# Patient Record
Sex: FEMALE | Race: OTHER | Employment: UNEMPLOYED | URBAN - METROPOLITAN AREA
[De-identification: names, ages, dates, MRNs, and addresses within clinical notes are randomized per-mention and may not be internally consistent; named-entity substitution may affect disease eponyms.]

---

## 2017-06-07 ENCOUNTER — HOSPITAL ENCOUNTER (OUTPATIENT)
Dept: BONE DENSITY | Age: 52
Discharge: HOME | End: 2017-06-07
Attending: FAMILY MEDICINE
Payer: COMMERCIAL

## 2017-06-07 DIAGNOSIS — Z78.0 POSTMENOPAUSAL: ICD-10-CM

## 2017-06-07 PROCEDURE — 77080 DXA BONE DENSITY AXIAL: CPT | Performed by: FAMILY MEDICINE

## 2017-08-01 ENCOUNTER — OFFICE VISIT (OUTPATIENT)
Dept: OBGYN CLINIC | Facility: CLINIC | Age: 52
End: 2017-08-01

## 2017-08-01 VITALS
HEART RATE: 72 BPM | DIASTOLIC BLOOD PRESSURE: 77 MMHG | BODY MASS INDEX: 37 KG/M2 | SYSTOLIC BLOOD PRESSURE: 118 MMHG | WEIGHT: 193.38 LBS

## 2017-08-01 DIAGNOSIS — Z01.419 ENCOUNTER FOR GYNECOLOGICAL EXAMINATION WITHOUT ABNORMAL FINDING: Primary | ICD-10-CM

## 2017-08-01 DIAGNOSIS — Z12.31 VISIT FOR SCREENING MAMMOGRAM: ICD-10-CM

## 2017-08-01 PROCEDURE — 99396 PREV VISIT EST AGE 40-64: CPT | Performed by: OBSTETRICS & GYNECOLOGY

## 2017-08-01 RX ORDER — ERGOCALCIFEROL 1.25 MG/1
CAPSULE ORAL
COMMUNITY
Start: 2017-06-09 | End: 2020-10-01

## 2017-08-01 RX ORDER — ZOLPIDEM TARTRATE 5 MG/1
TABLET ORAL
COMMUNITY
Start: 2017-07-06

## 2017-08-01 RX ORDER — OMEPRAZOLE 20 MG/1
CAPSULE, DELAYED RELEASE ORAL
COMMUNITY
Start: 2017-06-17 | End: 2017-09-15

## 2017-08-01 NOTE — PROGRESS NOTES
Brown Areas is a 47year old female  No LMP recorded. Patient is postmenopausal. Patient presents with:  Gyn Exam: Annual and mammo order  .     OBSTETRICS HISTORY:  OB History    Para Term  AB Living   2 2 2     2   SAB TAB Ectopic Mu • Diabetes Mother    • Hypertension Mother    • Lipids Mother      hyperlipidemia   • Hypertension Father    • Colon Cancer Father    • Lipids Brother      hyperlipidemia   • Ovarian Cancer Paternal Aunt 39   • Colon Cancer Paternal Grandfather 76   • Ca no adenopathy  Lymphatic:no abnormal supraclavicular or axillary adenopathy is noted  Breast: normal without palpable masses, tenderness, asymmetry, nipple discharge, nipple retraction or skin changes  Abdomen:  soft, nontender, nondistended, no masses  Sk

## 2017-08-08 ENCOUNTER — HOSPITAL ENCOUNTER (OUTPATIENT)
Dept: MAMMOGRAPHY | Age: 52
Discharge: HOME | End: 2017-08-08
Attending: OBSTETRICS & GYNECOLOGY
Payer: COMMERCIAL

## 2017-08-08 DIAGNOSIS — Z12.31 VISIT FOR SCREENING MAMMOGRAM: ICD-10-CM

## 2017-08-08 PROCEDURE — 77067 SCR MAMMO BI INCL CAD: CPT | Performed by: OBSTETRICS & GYNECOLOGY

## 2017-08-14 NOTE — PROGRESS NOTES
Normal mammogram.  Next in one year. Encouraged to do monthly self breast exams. Letter sent via mail.

## 2017-09-15 ENCOUNTER — TELEPHONE (OUTPATIENT)
Dept: GASTROENTEROLOGY | Facility: CLINIC | Age: 52
End: 2017-09-15

## 2017-09-15 ENCOUNTER — OFFICE VISIT (OUTPATIENT)
Dept: GASTROENTEROLOGY | Facility: CLINIC | Age: 52
End: 2017-09-15

## 2017-09-15 VITALS
WEIGHT: 188.63 LBS | DIASTOLIC BLOOD PRESSURE: 71 MMHG | HEIGHT: 60 IN | SYSTOLIC BLOOD PRESSURE: 104 MMHG | HEART RATE: 70 BPM | BODY MASS INDEX: 37.03 KG/M2

## 2017-09-15 DIAGNOSIS — K29.70 HELICOBACTER POSITIVE GASTRITIS: Primary | ICD-10-CM

## 2017-09-15 DIAGNOSIS — R10.13 DYSPEPSIA: Primary | ICD-10-CM

## 2017-09-15 DIAGNOSIS — R10.13 DYSPEPSIA: ICD-10-CM

## 2017-09-15 DIAGNOSIS — R10.13 EPIGASTRIC ABDOMINAL PAIN: ICD-10-CM

## 2017-09-15 DIAGNOSIS — B96.81 HELICOBACTER POSITIVE GASTRITIS: Primary | ICD-10-CM

## 2017-09-15 PROCEDURE — 99244 OFF/OP CNSLTJ NEW/EST MOD 40: CPT | Performed by: INTERNAL MEDICINE

## 2017-09-15 PROCEDURE — 99212 OFFICE O/P EST SF 10 MIN: CPT | Performed by: INTERNAL MEDICINE

## 2017-09-15 RX ORDER — LEVOFLOXACIN 500 MG/1
500 TABLET, FILM COATED ORAL EVERY 24 HOURS
Qty: 14 TABLET | Refills: 0 | Status: SHIPPED | OUTPATIENT
Start: 2017-09-15 | End: 2018-04-16

## 2017-09-15 RX ORDER — AMOXICILLIN 500 MG/1
1000 TABLET, FILM COATED ORAL 2 TIMES DAILY
Qty: 56 TABLET | Refills: 0 | Status: SHIPPED | OUTPATIENT
Start: 2017-09-15 | End: 2017-09-29

## 2017-09-15 RX ORDER — DOCUSATE SODIUM 100 MG/1
100 CAPSULE, LIQUID FILLED ORAL 2 TIMES DAILY
COMMUNITY

## 2017-09-15 RX ORDER — OMEPRAZOLE 20 MG/1
20 CAPSULE, DELAYED RELEASE ORAL
Qty: 60 CAPSULE | Refills: 1 | Status: SHIPPED | OUTPATIENT
Start: 2017-09-15 | End: 2017-09-30

## 2017-09-15 RX ORDER — ONDANSETRON 4 MG/1
4 TABLET, ORALLY DISINTEGRATING ORAL EVERY 12 HOURS PRN
Status: SHIPPED | OUTPATIENT
Start: 2017-09-15 | End: 2017-09-19

## 2017-09-15 NOTE — TELEPHONE ENCOUNTER
Scheduled for:  EGD 48530  Provider Name: Dr Tisha Carpenter   Date:   Mon 12/06/17  Location:  [facility]   Sedation:  MAC  Time:  7:30 am  Prep: Nothing after midnight the day before procedure and NPO 4 hrs prior procedure  Meds/Allergies Reconciled?:  NKDA  Diagnosis wit

## 2017-09-15 NOTE — PATIENT INSTRUCTIONS
1. Schedule upper endoscopy (EGD) with MAC -schedule in December 2017  2. New antibiotic regimen:   1. Levofloxacin 500mg PO daily (1 tablet daily)   2. Amoxicillin 1000mg (2 pills of the 500mg pills) twice a day   3. Omeprazole 20mg twice a day   4.  Faye Garrett

## 2017-09-15 NOTE — H&P
[address] [facility] [address] Gastroenterology                                                                                                  Clinic History and Physical     Pa Relation Age of Onset   • Heart Disease Maternal Uncle      CAD   • Cancer Other      spinal cord   • Diabetes Mother    • Hypertension Mother    • Lipids Mother      hyperlipidemia   • Hypertension Father    • Colon Cancer Father    • Lipids Brother for jaundice   ALLERGIC/IMMUNOLOGIC:  negative for hay fever  ENDOCRINE:  negative for cold intolerance and heat intolerance  MUSCULOSKELETAL:  negative for joint effusion/severe erythema  BEHAVIOR/PSYCH:  negative for psychotic behavior      PHYSICAL EXAM days prior to EGD     EGD consent: I have discussed the risks, benefits, and alternatives to upper endoscopy/enteroscopy with the patient [who demonstrated understanding], including but not limited to the risks of bleeding, infection, pain, death, as well

## 2017-10-09 ENCOUNTER — TELEPHONE (OUTPATIENT)
Dept: GASTROENTEROLOGY | Facility: CLINIC | Age: 52
End: 2017-10-09

## 2017-10-09 NOTE — TELEPHONE ENCOUNTER
CLN REPORT (8/2/16) - Dr. Antonio Evans    -[facility] Core reached  -No mass or polyps  -Small internal hemorrhoids    Recommendations: repeat CLN in 5 years.

## 2017-12-01 RX ORDER — OMEPRAZOLE 20 MG/1
20 CAPSULE, DELAYED RELEASE ORAL
Status: ON HOLD | COMMUNITY
End: 2017-12-06

## 2017-12-06 ENCOUNTER — TELEPHONE (OUTPATIENT)
Dept: GASTROENTEROLOGY | Facility: CLINIC | Age: 52
End: 2017-12-06

## 2017-12-06 ENCOUNTER — SURGERY (OUTPATIENT)
Age: 52
End: 2017-12-06

## 2017-12-06 ENCOUNTER — ANESTHESIA EVENT (OUTPATIENT)
Dept: ENDOSCOPY | Facility: HOSPITAL | Age: 52
End: 2017-12-06
Payer: COMMERCIAL

## 2017-12-06 ENCOUNTER — ANESTHESIA (OUTPATIENT)
Dept: ENDOSCOPY | Facility: HOSPITAL | Age: 52
End: 2017-12-06
Payer: COMMERCIAL

## 2017-12-06 ENCOUNTER — HOSPITAL ENCOUNTER (OUTPATIENT)
Facility: HOSPITAL | Age: 52
Setting detail: HOSPITAL OUTPATIENT SURGERY
Discharge: HOME | End: 2017-12-06
Attending: INTERNAL MEDICINE | Admitting: INTERNAL MEDICINE
Payer: COMMERCIAL

## 2017-12-06 DIAGNOSIS — R10.13 EPIGASTRIC ABDOMINAL PAIN: ICD-10-CM

## 2017-12-06 DIAGNOSIS — K31.9 GASTRIC ERYTHEMA: Primary | ICD-10-CM

## 2017-12-06 DIAGNOSIS — R10.13 DYSPEPSIA: ICD-10-CM

## 2017-12-06 PROCEDURE — 43239 EGD BIOPSY SINGLE/MULTIPLE: CPT | Performed by: INTERNAL MEDICINE

## 2017-12-06 PROCEDURE — 0DB68ZX EXCISION OF STOMACH, VIA NATURAL OR ARTIFICIAL OPENING ENDOSCOPIC, DIAGNOSTIC: ICD-10-PCS | Performed by: INTERNAL MEDICINE

## 2017-12-06 PROCEDURE — 0DB98ZX EXCISION OF DUODENUM, VIA NATURAL OR ARTIFICIAL OPENING ENDOSCOPIC, DIAGNOSTIC: ICD-10-PCS | Performed by: INTERNAL MEDICINE

## 2017-12-06 RX ORDER — SODIUM CHLORIDE, SODIUM LACTATE, POTASSIUM CHLORIDE, CALCIUM CHLORIDE 600; 310; 30; 20 MG/100ML; MG/100ML; MG/100ML; MG/100ML
INJECTION, SOLUTION INTRAVENOUS CONTINUOUS
Status: DISCONTINUED | OUTPATIENT
Start: 2017-12-06 | End: 2017-12-06

## 2017-12-06 RX ORDER — LIDOCAINE HYDROCHLORIDE 10 MG/ML
INJECTION, SOLUTION EPIDURAL; INFILTRATION; INTRACAUDAL; PERINEURAL AS NEEDED
Status: DISCONTINUED | OUTPATIENT
Start: 2017-12-06 | End: 2017-12-06 | Stop reason: SURG

## 2017-12-06 RX ORDER — NALOXONE HYDROCHLORIDE 0.4 MG/ML
80 INJECTION, SOLUTION INTRAMUSCULAR; INTRAVENOUS; SUBCUTANEOUS AS NEEDED
Status: DISCONTINUED | OUTPATIENT
Start: 2017-12-06 | End: 2017-12-06

## 2017-12-06 RX ORDER — OMEPRAZOLE 20 MG/1
20 CAPSULE, DELAYED RELEASE ORAL
Qty: 90 CAPSULE | Refills: 3 | Status: SHIPPED | OUTPATIENT
Start: 2017-12-06 | End: 2017-12-07

## 2017-12-06 RX ORDER — SODIUM CHLORIDE, SODIUM LACTATE, POTASSIUM CHLORIDE, CALCIUM CHLORIDE 600; 310; 30; 20 MG/100ML; MG/100ML; MG/100ML; MG/100ML
INJECTION, SOLUTION INTRAVENOUS CONTINUOUS PRN
Status: DISCONTINUED | OUTPATIENT
Start: 2017-12-06 | End: 2017-12-06 | Stop reason: SURG

## 2017-12-06 RX ADMIN — SODIUM CHLORIDE, SODIUM LACTATE, POTASSIUM CHLORIDE, CALCIUM CHLORIDE: 600; 310; 30; 20 INJECTION, SOLUTION INTRAVENOUS at 07:47:00

## 2017-12-06 RX ADMIN — LIDOCAINE HYDROCHLORIDE 100 MG: 10 INJECTION, SOLUTION EPIDURAL; INFILTRATION; INTRACAUDAL; PERINEURAL at 07:35:00

## 2017-12-06 RX ADMIN — SODIUM CHLORIDE, SODIUM LACTATE, POTASSIUM CHLORIDE, CALCIUM CHLORIDE: 600; 310; 30; 20 INJECTION, SOLUTION INTRAVENOUS at 07:34:00

## 2017-12-06 NOTE — ANESTHESIA PREPROCEDURE EVALUATION
Anesthesia PreOp Note    HPI:     Yue Samano is a 47year old female who presents for preoperative consultation requested by: Vlad Valente MD    Date of Surgery: 12/6/2017    Procedure(s):  ESOPHAGOGASTRODUODENOSCOPY (EGD)  Indication: Dyspepsia  Epi Taking       No current Epic-ordered facility-administered medications on file. No current [facility]-ordered outpatient prescriptions on file.     No Known Allergies    Family History   Problem Relation Age of Onset   • Heart Disease Maternal Uncle      CAD Anesthesia Plan:   ASA:  2  Plan:   MAC  Post-op Pain Management: IV analgesics  Informed Consent Plan and Risks Discussed With:  Patient  Use of Blood Products Discussed With:  Patient      I have informed Morro Srinivasan  of the nature of the an

## 2017-12-06 NOTE — OPERATIVE REPORT
ESOPHAGOGASTRODUODENOSCOPY (EGD) REPORT    Garcia Liana WILDE 4/3/1965 Age 47year old   PCP Manda Estrada MD Endoscopist Christina Hector MD     Date of procedure: 17    Procedure: EGD w/cold biopsy    Pre-operative diagnosis: dyspepsia, hist erythema in the antrum of the stomach, otherwise normal EGD. Biopsies obtained to see if H.pylori has been successfully eradicated. 2. Suspect non-erosive acid reflux disease. Recommend:  1. Await pathology. 2. Continue omeprazole 20mg/day.   3. Return

## 2017-12-06 NOTE — ANESTHESIA POSTPROCEDURE EVALUATION
Patient: Sameer Flores    Procedure Summary     Date:  12/06/17 Room / Location:  [address] [place] [address] ENDOSCOPY 01 / [address] [place] [address] ENDOSCOPY    Anesthesia Start:  8656 Anesthesia Stop:  0740    Procedure:  ESOPHAGOGASTRODUODENOSCOPY (EGD) (N/A ) Diagnosis:       Dyspepsia      [place]

## 2017-12-06 NOTE — H&P
History & Physical Examination    Patient Name: Jaya Rivera  MRN: [id number]  CSN: [id number]  YOB: 1965    Diagnosis: dyspepsia, epigastric pain, GERD      Prescriptions Prior to Admission:  omeprazole 20 MG Oral Capsule Delayed Release Take • Lipids Mother      hyperlipidemia   • Hypertension Father    • Colon Cancer Father    • Lipids Brother      hyperlipidemia   • Colon Cancer Paternal Grandfather 76   • Cancer Maternal Aunt 60     pancreatic        Smoking status: Never Smoker    [place]

## 2017-12-07 ENCOUNTER — TELEPHONE (OUTPATIENT)
Dept: GASTROENTEROLOGY | Facility: CLINIC | Age: 52
End: 2017-12-07

## 2017-12-07 VITALS
BODY MASS INDEX: 35.68 KG/M2 | HEIGHT: 61 IN | WEIGHT: 189 LBS | RESPIRATION RATE: 13 BRPM | DIASTOLIC BLOOD PRESSURE: 69 MMHG | SYSTOLIC BLOOD PRESSURE: 99 MMHG | OXYGEN SATURATION: 95 % | HEART RATE: 57 BPM

## 2017-12-07 RX ORDER — OMEPRAZOLE 20 MG/1
20 CAPSULE, DELAYED RELEASE ORAL
Qty: 90 CAPSULE | Refills: 3 | Status: SHIPPED | OUTPATIENT
Start: 2017-12-07 | End: 2018-03-07

## 2017-12-07 NOTE — TELEPHONE ENCOUNTER
Pt requesting to speak to RN about rx:omeprazole 20 MG, pt indicates the medication is not covered, pt used up 3 refills and the next refill needs to be filled through the [facility] for ins to cover. Pls call pt at:[phone],thanks.     Current Outpati

## 2017-12-07 NOTE — TELEPHONE ENCOUNTER
Pt contacted and she states that her omeprazole RX has to be filled through [facility] mail order pharmacy as she can only fill the medication 3 times through a retail pharmacy.      RX from 12/6/17 resent to [facility] Mail order and cancelled at [facility]

## 2017-12-11 ENCOUNTER — TELEPHONE (OUTPATIENT)
Dept: GASTROENTEROLOGY | Facility: CLINIC | Age: 52
End: 2017-12-11

## 2017-12-11 NOTE — TELEPHONE ENCOUNTER
I called and left a voicemail message for pt to return to my call to schedule a 3-6 month follow up appt.    I mailed out EGD results letter to pt    em

## 2018-04-16 ENCOUNTER — OFFICE VISIT (OUTPATIENT)
Dept: GASTROENTEROLOGY | Facility: CLINIC | Age: 53
End: 2018-04-16

## 2018-04-16 VITALS
DIASTOLIC BLOOD PRESSURE: 78 MMHG | SYSTOLIC BLOOD PRESSURE: 110 MMHG | WEIGHT: 193 LBS | HEART RATE: 61 BPM | HEIGHT: 60 IN | BODY MASS INDEX: 37.89 KG/M2

## 2018-04-16 DIAGNOSIS — B96.81 HELICOBACTER POSITIVE GASTRITIS: Primary | ICD-10-CM

## 2018-04-16 DIAGNOSIS — R10.13 DYSPEPSIA: ICD-10-CM

## 2018-04-16 DIAGNOSIS — K31.9 GASTRIC ERYTHEMA: ICD-10-CM

## 2018-04-16 DIAGNOSIS — K29.70 HELICOBACTER POSITIVE GASTRITIS: Primary | ICD-10-CM

## 2018-04-16 PROCEDURE — 99213 OFFICE O/P EST LOW 20 MIN: CPT | Performed by: INTERNAL MEDICINE

## 2018-04-16 PROCEDURE — 99212 OFFICE O/P EST SF 10 MIN: CPT | Performed by: INTERNAL MEDICINE

## 2018-04-16 RX ORDER — OMEPRAZOLE 20 MG/1
20 CAPSULE, DELAYED RELEASE ORAL
COMMUNITY

## 2018-04-16 NOTE — PROGRESS NOTES
[address] [facility] Follow-up Visit    Korin the stomach, otherwise normal EGD. PATH: neg H.pylori. Normal duodenum.          History, Medications, Allergies, ROS:      Past Medical History:   Diagnosis Date   • Cholelithiasis     laparoscopic cholecystectomy 9673   • Helicobacter positive gastritis 1 Tab  Disp:  Rfl:    alprazolam (XANAX) 0.5 MG Oral Tab Take 0.5 mg by mouth. At Bedtime Disp:  Rfl: 1   Cetirizine HCl (ZYRTEC ALLERGY) 10 MG Oral Cap Take  by mouth.  Disp:  Rfl:        Allergies:  No Known Allergies    ROS:   CONSTITUTIONAL:  negative for 20mg/day for non-erosive reflux disease, she can trial off PPI to see if med helps her.      #Hpylori infection - treated/eradicated  #Dyspepsia/epigastric pain - resolved  #Screening - 2016; repeat 5 years due to family hx of CRC     Recommend:  1. OKay to

## 2018-08-30 ENCOUNTER — OFFICE VISIT (OUTPATIENT)
Dept: OBGYN CLINIC | Facility: CLINIC | Age: 53
End: 2018-08-30
Payer: COMMERCIAL

## 2018-08-30 VITALS
BODY MASS INDEX: 36.91 KG/M2 | HEIGHT: 60.7 IN | SYSTOLIC BLOOD PRESSURE: 102 MMHG | WEIGHT: 193 LBS | DIASTOLIC BLOOD PRESSURE: 69 MMHG | HEART RATE: 72 BPM

## 2018-08-30 DIAGNOSIS — Z01.419 ENCOUNTER FOR GYNECOLOGICAL EXAMINATION WITHOUT ABNORMAL FINDING: Primary | ICD-10-CM

## 2018-08-30 DIAGNOSIS — Z12.31 VISIT FOR SCREENING MAMMOGRAM: ICD-10-CM

## 2018-08-30 PROCEDURE — 99396 PREV VISIT EST AGE 40-64: CPT | Performed by: OBSTETRICS & GYNECOLOGY

## 2018-08-30 RX ORDER — ALENDRONATE SODIUM 35 MG/1
35 TABLET ORAL
COMMUNITY

## 2018-08-30 NOTE — PROGRESS NOTES
Yue Samano is a 47year old female  No LMP recorded. Patient is postmenopausal. Patient presents with:  Gyn Exam: ANNUAL EXAM. No  bleed. NO change in hx or FHx. Does not drink milk or yogurt.   .    OBSTETRICS HISTORY:  OB History    Par Alcohol use No    Drug use: No    Sexual activity: Yes    Birth control/ protection: Tubal Ligation     Other Topics Concern    Caffeine Concern Yes    Comment: 2 cups soda daily     Social History Narrative   None on file       FAMILY HISTORY:  Family His vaginal itching  Musculoskeletal:   denies back pain. Skin/Breast:   denies any breast pain, lumps, or discharge. Neurological:    denies headaches, extremity weakness or numbness. Psychiatric:   denies depression or anxiety.   Endocrine:     denies exc vaginal bleeding. Encouraged 1500 mg calcium w/ vit D. Encouraged weight bearing exercise. Follow-up in one year.

## 2018-09-11 ENCOUNTER — HOSPITAL ENCOUNTER (OUTPATIENT)
Dept: MAMMOGRAPHY | Age: 53
Discharge: HOME | End: 2018-09-11
Attending: OBSTETRICS & GYNECOLOGY
Payer: COMMERCIAL

## 2018-09-11 DIAGNOSIS — Z12.31 VISIT FOR SCREENING MAMMOGRAM: ICD-10-CM

## 2018-09-11 PROCEDURE — 77067 SCR MAMMO BI INCL CAD: CPT | Performed by: OBSTETRICS & GYNECOLOGY

## 2019-04-06 ENCOUNTER — HOSPITAL ENCOUNTER (OUTPATIENT)
Dept: GENERAL RADIOLOGY | Age: 54
Discharge: HOME | End: 2019-04-06
Attending: FAMILY MEDICINE
Payer: COMMERCIAL

## 2019-04-06 DIAGNOSIS — R05.3 CHRONIC COUGH: ICD-10-CM

## 2019-04-06 PROCEDURE — 71046 X-RAY EXAM CHEST 2 VIEWS: CPT | Performed by: FAMILY MEDICINE

## 2019-09-05 ENCOUNTER — OFFICE VISIT (OUTPATIENT)
Dept: OBGYN CLINIC | Facility: CLINIC | Age: 54
End: 2019-09-05
Payer: COMMERCIAL

## 2019-09-05 VITALS
DIASTOLIC BLOOD PRESSURE: 65 MMHG | HEART RATE: 105 BPM | SYSTOLIC BLOOD PRESSURE: 89 MMHG | BODY MASS INDEX: 39 KG/M2 | WEIGHT: 206 LBS

## 2019-09-05 DIAGNOSIS — Z01.419 ENCOUNTER FOR GYNECOLOGICAL EXAMINATION WITHOUT ABNORMAL FINDING: Primary | ICD-10-CM

## 2019-09-05 DIAGNOSIS — Z12.31 VISIT FOR SCREENING MAMMOGRAM: ICD-10-CM

## 2019-09-05 PROCEDURE — 99396 PREV VISIT EST AGE 40-64: CPT | Performed by: OBSTETRICS & GYNECOLOGY

## 2019-09-05 RX ORDER — BUPROPION HYDROCHLORIDE 150 MG/1
TABLET ORAL
COMMUNITY
Start: 2019-08-14

## 2019-09-05 NOTE — PROGRESS NOTES
Ondina Brunson is a 49year old female  No LMP recorded. Patient is postmenopausal. Patient presents with:  Gyn Exam: Annual, mammogram order -- gained weight again -- loves sugar & not exercising  .     OBSTETRICS HISTORY:  OB History    Para T education level: Not on file    Occupational History      Not on file    Social Needs      Financial resource strain: Not on file      Food insecurity:        Worry: Not on file        Inability: Not on file      Transportation needs:        Medical: Not o Hypertension Mother    • Lipids Mother         hyperlipidemia   • Hypertension Father    • Colon Cancer Father    • Lipids Brother         hyperlipidemia   • Colon Cancer Paternal Grandfather 76   • Cancer Maternal Aunt 61        pancreatic       MEDICATIO bleeding      PHYSICAL EXAM:   Blood pressure (!) 89/65, pulse 105, weight 206 lb (93.4 kg).   Constitutional:  well developed, well nourished  Head/Face:  normocephalic  Neck/Thyroid: thyroid symmetric, no thyromegaly, no nodules, no adenopathy  Lymphatic:

## 2019-09-06 LAB — HPV I/H RISK 1 DNA SPEC QL NAA+PROBE: NEGATIVE

## 2019-09-20 ENCOUNTER — HOSPITAL ENCOUNTER (OUTPATIENT)
Dept: MAMMOGRAPHY | Age: 54
Discharge: HOME | End: 2019-09-20
Attending: OBSTETRICS & GYNECOLOGY
Payer: COMMERCIAL

## 2019-09-20 DIAGNOSIS — Z12.31 VISIT FOR SCREENING MAMMOGRAM: ICD-10-CM

## 2019-09-20 PROCEDURE — 77063 BREAST TOMOSYNTHESIS BI: CPT | Performed by: OBSTETRICS & GYNECOLOGY

## 2019-09-20 PROCEDURE — 77067 SCR MAMMO BI INCL CAD: CPT | Performed by: OBSTETRICS & GYNECOLOGY

## 2020-10-01 ENCOUNTER — OFFICE VISIT (OUTPATIENT)
Dept: OBGYN CLINIC | Facility: CLINIC | Age: 55
End: 2020-10-01
Payer: COMMERCIAL

## 2020-10-01 VITALS
DIASTOLIC BLOOD PRESSURE: 75 MMHG | WEIGHT: 201 LBS | HEIGHT: 60 IN | SYSTOLIC BLOOD PRESSURE: 112 MMHG | BODY MASS INDEX: 39.46 KG/M2 | HEART RATE: 83 BPM

## 2020-10-01 DIAGNOSIS — Z01.419 ENCOUNTER FOR GYNECOLOGICAL EXAMINATION WITHOUT ABNORMAL FINDING: Primary | ICD-10-CM

## 2020-10-01 PROCEDURE — 3008F BODY MASS INDEX DOCD: CPT | Performed by: OBSTETRICS & GYNECOLOGY

## 2020-10-01 PROCEDURE — 3078F DIAST BP <80 MM HG: CPT | Performed by: OBSTETRICS & GYNECOLOGY

## 2020-10-01 PROCEDURE — 99396 PREV VISIT EST AGE 40-64: CPT | Performed by: OBSTETRICS & GYNECOLOGY

## 2020-10-01 PROCEDURE — 3074F SYST BP LT 130 MM HG: CPT | Performed by: OBSTETRICS & GYNECOLOGY

## 2020-10-01 NOTE — PROGRESS NOTES
Kulwant Chandra is a 48year old female  No LMP recorded. Patient is postmenopausal. Patient presents with:  Gyn Exam: ANNUAL EXAM --  no change in hx. No  bleed  .     OBSTETRICS HISTORY:  OB History    Para Term  AB Living   2 2 2 level: Not on file    Occupational History      Not on file    Social Needs      Financial resource strain: Not on file      Food insecurity        Worry: Not on file        Inability: Not on file      Transportation needs        Medical: Not on file • Lipids Mother         hyperlipidemia   • Hypertension Father    • Colon Cancer Father    • Lipids Brother         hyperlipidemia   • Colon Cancer Paternal Grandfather 76   • Cancer Maternal Aunt 60        pancreatic   • Breast Cancer Neg        MEDICA 83, height 5' (1.524 m), weight 201 lb (91.2 kg).   Constitutional:  well developed, well nourished  Head/Face:  normocephalic  Neck/Thyroid: thyroid symmetric, no thyromegaly, no nodules, no adenopathy  Lymphatic: no abnormal supraclavicular or axillary ad

## 2020-10-06 ENCOUNTER — TELEPHONE (OUTPATIENT)
Dept: OBGYN CLINIC | Facility: CLINIC | Age: 55
End: 2020-10-06

## 2020-10-06 DIAGNOSIS — Z12.31 SCREENING MAMMOGRAM, ENCOUNTER FOR: Primary | ICD-10-CM

## 2020-10-06 NOTE — TELEPHONE ENCOUNTER
Patient requesting order for mammogram, please update through [facility] portal or calling at:[phone],thanks.

## 2020-10-28 ENCOUNTER — HOSPITAL ENCOUNTER (OUTPATIENT)
Dept: MAMMOGRAPHY | Age: 55
Discharge: HOME | End: 2020-10-28
Attending: OBSTETRICS & GYNECOLOGY
Payer: COMMERCIAL

## 2020-10-28 DIAGNOSIS — Z12.31 SCREENING MAMMOGRAM, ENCOUNTER FOR: ICD-10-CM

## 2020-10-28 PROCEDURE — 77063 BREAST TOMOSYNTHESIS BI: CPT | Performed by: OBSTETRICS & GYNECOLOGY

## 2020-10-28 PROCEDURE — 77067 SCR MAMMO BI INCL CAD: CPT | Performed by: OBSTETRICS & GYNECOLOGY

## 2020-12-11 NOTE — LETTER
12/9/2017              Luci [address]         Dear Yeimy Zamorano,    I reviewed the pathology report from the biopsies done during your recent upper endoscopy.  Based on the report, you have NO evidence of H.pylori negative - no nasal congestion

## 2021-03-04 ENCOUNTER — TELEPHONE (OUTPATIENT)
Dept: OBGYN CLINIC | Facility: CLINIC | Age: 56
End: 2021-03-04

## 2021-03-04 NOTE — TELEPHONE ENCOUNTER
Pt reports on 3/1/2021 pts hemorrhoids \"began acting up\". Pt states she has \"mild\" edema and pain to vagina. Pt states pain is \"really just discomfort\" if she touches and/or wipes after urinating.  Pt states she has been using OTC Preparation H to hem

## 2021-03-08 NOTE — TELEPHONE ENCOUNTER
Agree with triage. PCP or GI handle hemorrhoids if that is cause of Sx.  Would recommend exam if does not improve

## 2021-03-08 NOTE — TELEPHONE ENCOUNTER
Informed pt that NJG stated she agrees with triage. Informed pt that she should see her pcp or GI handle hemorrhoids if that is cause of SX. Informed pt NJG would rec exam if does not improve. Pt states that she has an appt today with pcp.

## 2021-08-17 ENCOUNTER — TELEPHONE (OUTPATIENT)
Dept: GASTROENTEROLOGY | Facility: CLINIC | Age: 56
End: 2021-08-17

## 2021-08-17 DIAGNOSIS — Z12.11 COLON CANCER SCREENING: Primary | ICD-10-CM

## 2021-08-17 NOTE — TELEPHONE ENCOUNTER
Last Procedure, Date, MD: Colonoscopy, 8/2/16, Dr. Woody [place]  Last Diagnosis: Small internal hemorrhoids  Recalled for (mth/yrs): 5 years  Sedation used previously: MAC  Last Prep Used (if known):  n/a  Quality of prep (if known): excellent  Anticoagulants: n/a  Diabetic Meds: n/a  BP meds(Ace inhibitors/ARB's): n/a  Weight loss meds (phentermine/vyvanse): n/a  Iron supplement (RX/OTC): n/a  Height & Weight/BMI: 5'1\"/204lbs/38.5  Hx of Cardiac/CVA issues/(MI/Stroke): n/a  Devices Pacemaker/Defibrillator/Stents: n/a  Resp. Issues/Oxygen Use/DENNY/COPD: n/a  Issues w/Anesthesia: n/a    Symptoms (Y/N): N  Symptoms Details: n/a     Special comments/notes: verified allergies, medications, pharmacy. Please advise on orders and prep, thank you.

## 2021-08-18 RX ORDER — POLYETHYLENE GLYCOL 3350, SODIUM CHLORIDE, SODIUM BICARBONATE, POTASSIUM CHLORIDE 420; 11.2; 5.72; 1.48 G/4L; G/4L; G/4L; G/4L
POWDER, FOR SOLUTION ORAL
Qty: 4000 ML | Refills: 0 | Status: SHIPPED | OUTPATIENT
Start: 2021-08-18 | End: 2023-03-02

## 2021-08-18 NOTE — TELEPHONE ENCOUNTER
Scheduling:  ladonna w/ EUGENIO w/ Dr. Rena Schwab   Dx; FHx colon ca, crc screening  trilyte split dose sent s-cribe

## 2021-08-27 NOTE — TELEPHONE ENCOUNTER
I spoke with this patient to schedule her procedure but she stated that she was going out of the country and will not be back until March or April of 2022. She will CB one month before she leaves to come home.

## 2021-10-29 ENCOUNTER — HOSPITAL ENCOUNTER (OUTPATIENT)
Dept: MAMMOGRAPHY | Age: 56
Discharge: HOME | End: 2021-10-29
Attending: FAMILY MEDICINE
Payer: COMMERCIAL

## 2021-10-29 DIAGNOSIS — Z12.31 ENCOUNTER FOR SCREENING MAMMOGRAM FOR MALIGNANT NEOPLASM OF BREAST: ICD-10-CM

## 2021-10-29 PROCEDURE — 77067 SCR MAMMO BI INCL CAD: CPT | Performed by: FAMILY MEDICINE

## 2021-10-29 PROCEDURE — 77063 BREAST TOMOSYNTHESIS BI: CPT | Performed by: FAMILY MEDICINE

## 2022-03-01 NOTE — TELEPHONE ENCOUNTER
Scheduled for:  Colonoscopy 57811  Provider Name:  Dr Thelma Padilla  Date: 05/09/2022   Location: [facility]     Sedation: MAC   Time:  8:45 patient is aware [facility] will call with time of arrival  Prep:  trilyte prp instructions to be sent via my chart   Meds/Allergies Reconciled?:  Physician reviewed   Diagnosis with codes:    Was patient informed to call insurance with codes (Y/N):  Yes, I confirmed BCBS PPO insurance with the patient. Referral sent?:  yes  [facility] or [facility] notified?:  I sent an electronic request to Endo Scheduling and received a confirmation today. Medication Orders: This patient verbally confirmed that she  is not taking:   Iron, blood thinners, BP meds, and is not diabetic   Not latex allergy, Not PCN allergy and does not have a pacemaker     Patient is aware to hold vitamins and supplements x 7 days prior to procedure   Misc Orders:   Patient is aware she will be scheduled for a covid 19 test prior to procedure       Further instructions given by staff:     This patient had no questions regarding the prep instructions

## 2022-05-09 ENCOUNTER — SURGERY CENTER DOCUMENTATION (OUTPATIENT)
Dept: SURGERY | Age: 57
End: 2022-05-09

## 2022-05-09 ENCOUNTER — LAB REQUISITION (OUTPATIENT)
Dept: SURGERY | Age: 57
End: 2022-05-09
Payer: COMMERCIAL

## 2022-05-09 PROCEDURE — 88305 TISSUE EXAM BY PATHOLOGIST: CPT | Performed by: INTERNAL MEDICINE

## 2022-05-19 ENCOUNTER — TELEPHONE (OUTPATIENT)
Dept: GASTROENTEROLOGY | Facility: CLINIC | Age: 57
End: 2022-05-19

## 2022-05-19 NOTE — TELEPHONE ENCOUNTER
I mailed out colonoscopy results letter to pt  Updated health maintenance  Entered into 5 yr CLN recall  Recall colon in 5 years per.  Colon done 5/09/2022    Kam Gonzalez MD  P Em Gi Clinical Staff  GI staff: please place recall for colonoscopy in 5 years

## 2023-03-02 ENCOUNTER — MED REC SCAN ONLY (OUTPATIENT)
Dept: PAIN CLINIC | Facility: HOSPITAL | Age: 58
End: 2023-03-02

## 2023-03-02 ENCOUNTER — OFFICE VISIT (OUTPATIENT)
Dept: PAIN CLINIC | Facility: HOSPITAL | Age: 58
End: 2023-03-02
Attending: STUDENT IN AN ORGANIZED HEALTH CARE EDUCATION/TRAINING PROGRAM
Payer: COMMERCIAL

## 2023-03-02 VITALS
WEIGHT: 201 LBS | DIASTOLIC BLOOD PRESSURE: 92 MMHG | SYSTOLIC BLOOD PRESSURE: 140 MMHG | HEIGHT: 60 IN | RESPIRATION RATE: 18 BRPM | HEART RATE: 72 BPM | BODY MASS INDEX: 39.46 KG/M2

## 2023-03-02 DIAGNOSIS — M51.26 LUMBAR HERNIATED DISC: ICD-10-CM

## 2023-03-02 DIAGNOSIS — M54.50 SEVERE LUMBAR PAIN: ICD-10-CM

## 2023-03-02 DIAGNOSIS — M51.26 LUMBAR HERNIATED DISC: Primary | ICD-10-CM

## 2023-03-02 PROCEDURE — 99202 OFFICE O/P NEW SF 15 MIN: CPT

## 2023-03-02 RX ORDER — ACETAMINOPHEN AND CODEINE PHOSPHATE 300; 30 MG/1; MG/1
1-2 TABLET ORAL EVERY 6 HOURS PRN
Qty: 30 TABLET | Refills: 0 | Status: SHIPPED | OUTPATIENT
Start: 2023-03-02

## 2023-03-02 RX ORDER — PREGABALIN 75 MG/1
75 CAPSULE ORAL NIGHTLY
Qty: 30 CAPSULE | Refills: 0 | Status: SHIPPED | OUTPATIENT
Start: 2023-03-02

## 2023-03-02 NOTE — PROGRESS NOTES
3/2/2023-presents ambulatory to CPM to establish care; NEW CONSULT C/O 6WK HX RLBP RADIATING INTO RT BUTTOCK AND DOWN RLE INTO THE RT FT; pt denies injury/trauma; states she was in [facility] out for a walk, when the pain began; she was unable to see a doctor in [facility] so she went to [facility] and no one there could assist her; she then flew home and saw her PCP; Referred by Dr. Cisco Aldridge -PCP; examined by Dr. Jatinder Soriano; reviewed MRI; refer to dictation for the plan of care.

## 2023-03-03 ENCOUNTER — TELEPHONE (OUTPATIENT)
Dept: PAIN CLINIC | Facility: HOSPITAL | Age: 58
End: 2023-03-03

## 2023-03-03 NOTE — TELEPHONE ENCOUNTER
Scheduled procedure for: 3/8/23    Procedure follow-up for: 3/22/23 at 10 am.    Surgery Scheduling Form faxed to [phone] at [phone]

## 2023-03-03 NOTE — TELEPHONE ENCOUNTER
Right Transforaminal SILVIA L5/S1 - Cpt 55122 - Dx M51.26, O28.59 - NO PRE-CERT REQ    Spoke with Jessenia Rivera with [address] [place] [address] on 3/3/23 at 2:15 pm, request above is authorized. VTE Assessment already completed for this visit

## 2023-03-08 ENCOUNTER — SURGERY CENTER DOCUMENTATION (OUTPATIENT)
Dept: SURGERY | Age: 58
End: 2023-03-08

## 2023-03-08 NOTE — PROCEDURES
[address]            Patient: Barb Floyd  : 4/3/1965        Operative Report        Date of procedure: 3/8/2023    Preoperative diagnosis: lumbar radiculopathy   Postop diagnosis: lumbar radiculopathy         Procedure:    Transforaminal epidural steroid injection fluoroscopic guidance and possible contrast image saved    Surgeon: Cristofer Gunn MD    Indications: 65year old female w/ HNP and R sided lumbar radiculopathy     Operative procedure: The patient was brought to the local room suite and placed in the prone position with a pillow under the lower pelvis. Patient was prepped and draped in normal sterile fashion. The endplates of the vertebral bodies were aligned. Fluoroscopic beam was rotated ipsilateral to align the superior articulating process of the vertebra below with the 6 o'clock position of the above pedicle. Xylocaine 1% was instilled into the skin and subcutaneous tissue over the right L5/S1 level at which point a 22-gauge, 5-1/2 inch spinal needle was introduced and passed under direct fluoroscopic guidance to the bone of the pedicle at the 6 o'clock position. The needle was then redirected inferiorly to the anterior portion of the foramen. There was no CSF, paresthesia, or blood noted. After negative aspiration a solution of Omnipaque 240 contrast media, 1mL was instilled which showed flow into the epidural space as well as an outline of the nerve root tracking distally. There is no evidence of subarachnoid, subdural, or intravascular spread. This was checked in both AP and lateral views. At this point after negative aspiration, a solution of Depo-Medrol 80 mg and 2 mL's of preservative-free normal saline was instilled. The needle was withdrawn. A Band-Aid applied. The patient tolerated procedure well without complication and was discharged home with instructions. Electronically approved by:    Cristofer Gunn MD

## 2023-03-22 ENCOUNTER — OFFICE VISIT (OUTPATIENT)
Dept: PAIN CLINIC | Facility: HOSPITAL | Age: 58
End: 2023-03-22
Attending: NURSE PRACTITIONER
Payer: COMMERCIAL

## 2023-03-22 VITALS — DIASTOLIC BLOOD PRESSURE: 80 MMHG | OXYGEN SATURATION: 96 % | SYSTOLIC BLOOD PRESSURE: 148 MMHG | HEART RATE: 75 BPM

## 2023-03-22 DIAGNOSIS — M51.26 LUMBAR HERNIATED DISC: ICD-10-CM

## 2023-03-22 DIAGNOSIS — M54.16 LUMBAR RADICULOPATHY: ICD-10-CM

## 2023-03-22 DIAGNOSIS — M51.36 LUMBAR DEGENERATIVE DISC DISEASE: Primary | ICD-10-CM

## 2023-03-22 PROCEDURE — 99211 OFF/OP EST MAY X REQ PHY/QHP: CPT

## 2023-03-22 NOTE — PROGRESS NOTES
PT presents ambulatory to the C[facility].  PT states about 90% improvement with pain but still has numbness and tingling in her RLE. 1/10 pain. Tingling increases with sitting, leaning against her headboard and driving. FERNY Wang saw PT for RT TRANS L5/S1 F/U. See notes for POC .

## 2023-04-04 ENCOUNTER — TELEPHONE (OUTPATIENT)
Dept: PHYSICAL THERAPY | Facility: HOSPITAL | Age: 58
End: 2023-04-04

## 2023-04-06 ENCOUNTER — OFFICE VISIT (OUTPATIENT)
Dept: PHYSICAL THERAPY | Age: 58
End: 2023-04-06
Attending: NURSE PRACTITIONER
Payer: COMMERCIAL

## 2023-04-06 DIAGNOSIS — M51.36 LUMBAR DEGENERATIVE DISC DISEASE: ICD-10-CM

## 2023-04-06 DIAGNOSIS — M54.16 LUMBAR RADICULOPATHY: ICD-10-CM

## 2023-04-06 PROCEDURE — 97162 PT EVAL MOD COMPLEX 30 MIN: CPT

## 2023-04-06 PROCEDURE — 97110 THERAPEUTIC EXERCISES: CPT

## 2023-04-11 ENCOUNTER — OFFICE VISIT (OUTPATIENT)
Dept: PHYSICAL THERAPY | Age: 58
End: 2023-04-11
Attending: NURSE PRACTITIONER
Payer: COMMERCIAL

## 2023-04-11 PROCEDURE — 97110 THERAPEUTIC EXERCISES: CPT

## 2023-04-11 PROCEDURE — 97140 MANUAL THERAPY 1/> REGIONS: CPT

## 2023-04-13 ENCOUNTER — OFFICE VISIT (OUTPATIENT)
Dept: PHYSICAL THERAPY | Age: 58
End: 2023-04-13
Attending: NURSE PRACTITIONER
Payer: COMMERCIAL

## 2023-04-13 PROCEDURE — 97110 THERAPEUTIC EXERCISES: CPT

## 2023-04-18 ENCOUNTER — OFFICE VISIT (OUTPATIENT)
Dept: PHYSICAL THERAPY | Age: 58
End: 2023-04-18
Attending: NURSE PRACTITIONER
Payer: COMMERCIAL

## 2023-04-18 PROCEDURE — 97110 THERAPEUTIC EXERCISES: CPT

## 2023-04-18 PROCEDURE — 97140 MANUAL THERAPY 1/> REGIONS: CPT

## 2023-04-20 ENCOUNTER — OFFICE VISIT (OUTPATIENT)
Dept: PHYSICAL THERAPY | Age: 58
End: 2023-04-20
Attending: NURSE PRACTITIONER
Payer: COMMERCIAL

## 2023-04-20 PROCEDURE — 97140 MANUAL THERAPY 1/> REGIONS: CPT

## 2023-04-20 PROCEDURE — 97110 THERAPEUTIC EXERCISES: CPT

## 2023-04-25 ENCOUNTER — OFFICE VISIT (OUTPATIENT)
Dept: PHYSICAL THERAPY | Age: 58
End: 2023-04-25
Attending: NURSE PRACTITIONER
Payer: COMMERCIAL

## 2023-04-25 PROCEDURE — 97110 THERAPEUTIC EXERCISES: CPT

## 2023-04-27 ENCOUNTER — OFFICE VISIT (OUTPATIENT)
Dept: PHYSICAL THERAPY | Age: 58
End: 2023-04-27
Attending: NURSE PRACTITIONER
Payer: COMMERCIAL

## 2023-04-27 PROCEDURE — 97140 MANUAL THERAPY 1/> REGIONS: CPT

## 2023-04-27 PROCEDURE — 97110 THERAPEUTIC EXERCISES: CPT

## 2023-05-02 ENCOUNTER — OFFICE VISIT (OUTPATIENT)
Dept: PHYSICAL THERAPY | Age: 58
End: 2023-05-02
Attending: NURSE PRACTITIONER
Payer: COMMERCIAL

## 2023-05-02 PROCEDURE — 97110 THERAPEUTIC EXERCISES: CPT

## 2023-05-02 PROCEDURE — 97140 MANUAL THERAPY 1/> REGIONS: CPT

## 2023-05-09 ENCOUNTER — OFFICE VISIT (OUTPATIENT)
Dept: PHYSICAL THERAPY | Age: 58
End: 2023-05-09
Attending: NURSE PRACTITIONER
Payer: COMMERCIAL

## 2023-05-09 PROCEDURE — 97140 MANUAL THERAPY 1/> REGIONS: CPT

## 2023-05-09 PROCEDURE — 97110 THERAPEUTIC EXERCISES: CPT

## 2023-05-18 ENCOUNTER — OFFICE VISIT (OUTPATIENT)
Dept: PHYSICAL THERAPY | Age: 58
End: 2023-05-18
Attending: NURSE PRACTITIONER
Payer: COMMERCIAL

## 2023-05-18 PROCEDURE — 97110 THERAPEUTIC EXERCISES: CPT

## 2023-05-23 ENCOUNTER — OFFICE VISIT (OUTPATIENT)
Dept: PHYSICAL THERAPY | Age: 58
End: 2023-05-23
Attending: FAMILY MEDICINE
Payer: COMMERCIAL

## 2023-05-23 PROCEDURE — 97110 THERAPEUTIC EXERCISES: CPT

## 2023-05-31 ENCOUNTER — OFFICE VISIT (OUTPATIENT)
Dept: PHYSICAL THERAPY | Age: 58
End: 2023-05-31
Attending: FAMILY MEDICINE
Payer: COMMERCIAL

## 2023-05-31 PROCEDURE — 97110 THERAPEUTIC EXERCISES: CPT

## 2023-05-31 PROCEDURE — 97140 MANUAL THERAPY 1/> REGIONS: CPT

## 2023-06-01 ENCOUNTER — OFFICE VISIT (OUTPATIENT)
Dept: OBGYN CLINIC | Facility: CLINIC | Age: 58
End: 2023-06-01

## 2023-06-01 VITALS
WEIGHT: 206 LBS | BODY MASS INDEX: 40 KG/M2 | HEART RATE: 69 BPM | DIASTOLIC BLOOD PRESSURE: 78 MMHG | SYSTOLIC BLOOD PRESSURE: 115 MMHG

## 2023-06-01 DIAGNOSIS — N76.2 ACUTE VULVITIS: ICD-10-CM

## 2023-06-01 DIAGNOSIS — N90.9 VULVAR DISORDER: ICD-10-CM

## 2023-06-01 DIAGNOSIS — Z12.31 VISIT FOR SCREENING MAMMOGRAM: ICD-10-CM

## 2023-06-01 DIAGNOSIS — Z01.411 ENCOUNTER FOR GYNECOLOGICAL EXAMINATION WITH ABNORMAL FINDING: Primary | ICD-10-CM

## 2023-06-01 PROCEDURE — 87106 FUNGI IDENTIFICATION YEAST: CPT | Performed by: OBSTETRICS & GYNECOLOGY

## 2023-06-01 PROCEDURE — 87808 TRICHOMONAS ASSAY W/OPTIC: CPT | Performed by: OBSTETRICS & GYNECOLOGY

## 2023-06-01 PROCEDURE — 87624 HPV HI-RISK TYP POOLED RSLT: CPT | Performed by: OBSTETRICS & GYNECOLOGY

## 2023-06-01 PROCEDURE — 87205 SMEAR GRAM STAIN: CPT | Performed by: OBSTETRICS & GYNECOLOGY

## 2023-06-01 NOTE — PROGRESS NOTES
Dinora To is a 63year old female  No LMP recorded. Patient is postmenopausal. Patient presents with:  Gyn Exam: Annual, mammogram order -- last seen in   Gyn Problem: Left sided soreness / discomfort in private area -- getting worse  Denies postmenopausal bleeding. OBSTETRICS HISTORY:     OB History    Para Term  AB Living   2 2 2     2   SAB IAB Ectopic Multiple Live Births           2      # Outcome Date GA Lbr Chuy/2nd Weight Sex Delivery Anes PTL Lv   2 Term     M CS-LVertical   JAYCEE   1 Term     F CS-LVertical   JAYCEE       GYNE HISTORY:     Periods none due to menopause        Latest Ref Rng & Units 2019    11:13 AM   RECENT PAP RESULTS   Thinprep Pap Negative for intraepithelial lesion or malignancy Negative for intraepithelial lesion or malignancy     HPV Negative Negative           MEDICAL HISTORY:     Past Medical History:   Diagnosis Date    Cholelithiasis     laparoscopic cholecystectomy 695    Helicobacter positive gastritis 2017    treated and eradicated    High cholesterol     Hyperlipidemia 2004    Post-menopausal bleeding      bleed, neg endometrial biopsy     Screen for colon cancer 2016    +polyp in , repeat CLN in .  +family hx of CRC     Past Surgical History:   Procedure Laterality Date    BIOPSY OF UTERUS LINING      neg endometrial biopsy          x 2    CHOLECYSTECTOMY      EGD      LAPAROSCOPIC CHOLECYSTECTOMY      TUBAL LIGATION      laparoscopic BTL     OB History     T2    L2    SAB0  IAB0  Ectopic0  Multiple0  Live Births2      SOCIAL HISTORY:     Social History    Socioeconomic History      Marital status:       Spouse name: Not on file      Number of children: Not on file      Years of education: Not on file      Highest education level: Not on file    Occupational History      Not on file    Tobacco Use      Smoking status: Never      Smokeless tobacco: Never    Substance and Sexual Activity      Alcohol use: No        Alcohol/week: 0.0 standard drinks of alcohol      Drug use: No      Sexual activity: Yes        Birth control/protection: Tubal Ligation    Other Topics      Concerns:         Service: Not Asked        Blood Transfusions: Not Asked        Caffeine Concern: Yes          2 cups soda daily        Occupational Exposure: Not Asked        Hobby Hazards: Not Asked        Sleep Concern: Not Asked        Stress Concern: Not Asked        Weight Concern: Not Asked        Special Diet: Not Asked        Back Care: Not Asked        Exercise: Not Asked        Bike Helmet: Not Asked        Seat Belt: Not Asked        Self-Exams: Not Asked    Social History Narrative      Not on file    Social Determinants of Health  Financial Resource Strain: Not on file  Food Insecurity: Not on file  Transportation Needs: Not on file  Physical Activity: Not on file  Stress: Not on file  Social Connections: Not on file  Housing Stability: Not on file    FAMILY HISTORY:     Family History   Problem Relation Age of Onset    Heart Disease Maternal Uncle         CAD    Cancer Other         spinal cord    Diabetes Mother     Hypertension Mother     Lipids Mother         hyperlipidemia    Hypertension Father     Colon Cancer Father     Lipids Brother         hyperlipidemia    Colon Cancer Paternal Grandfather 76    Cancer Maternal Aunt 60        pancreatic    Ovarian Cancer Paternal Aunt         45s    Breast Cancer Neg        MEDICATIONS:       Current Outpatient Medications:     buPROPion HCl ER, XL, 150 MG Oral Tablet 24 Hr, , Disp: , Rfl:     Calcium Carbonate-Vitamin D (CALCIUM-D) 600-400 MG-UNIT Oral Tab, Take by mouth., Disp: , Rfl:     alendronate 35 MG Oral Tab, Take 1 tablet (35 mg total) by mouth every 7 days. , Disp: , Rfl:     omeprazole 20 MG Oral Capsule Delayed Release, Take 1 capsule (20 mg total) by mouth every morning before breakfast., Disp: , Rfl:     docusate sodium 100 MG Oral Cap, Take 1 capsule (100 mg total) by mouth 2 (two) times daily. , Disp: , Rfl:     TraZODone HCl 100 MG Oral Tab, , Disp: , Rfl:     Sertraline HCl (ZOLOFT) 100 MG Oral Tab, , Disp: , Rfl:     Atorvastatin Calcium (LIPITOR) 10 MG Oral Tab, , Disp: , Rfl:     Cetirizine HCl (ZYRTEC ALLERGY) 10 MG Oral Cap, Take  by mouth., Disp: , Rfl:     pregabalin 75 MG Oral Cap, Take 1 capsule (75 mg total) by mouth at bedtime. , Disp: 30 capsule, Rfl: 0    acetaminophen-codeine 300-30 MG Oral Tab, Take 1-2 tablets by mouth every 6 (six) hours as needed for Pain., Disp: 30 tablet, Rfl: 0    diazepam 5 MG Oral Tab, , Disp: , Rfl: 0    naproxen 500 MG Oral Tab, , Disp: , Rfl: 0    alprazolam (XANAX) 0.5 MG Oral Tab, Take 0.5 mg by mouth. At Bedtime (Patient not taking: Reported on 8/17/2021), Disp: , Rfl: 1    ALLERGIES:     No Known Allergies      REVIEW OF SYSTEMS:     Constitutional:    denies fever / chills  Eyes:     denies blurred or double vision  Cardiovascular:  denies chest pain or palpitations  Respiratory:    denies shortness of breath  Gastrointestinal:  denies severe abdominal pain, frequent diarrhea or constipation, nausea / vomiting  Genitourinary:    denies dysuria, bothersome incontinence  Skin/Breast:   denies any breast pain, lumps, or discharge  Neurological:    denies frequent severe headaches  Psychiatric:   denies depression or anxiety, thoughts of harming self or others  Heme/Lymph:    denies easy bruising or bleeding    PHYSICAL EXAM:   Blood pressure 115/78, pulse 69, weight 206 lb (93.4 kg).   Constitutional: well developed, well nourished  Head/Face: normocephalic  Neck/Thyroid: thyroid symmetric, no thyromegaly, no nodules, no adenopathy  Lymphatic:no abnormal supraclavicular or axillary adenopathy is noted  Breast: normal without palpable masses, tenderness, asymmetry, nipple discharge, nipple retraction or skin changes  Respiratory:  nonlabored breathing  Cardiovascular: regular rate and rhythm  Abdomen:  soft, nontender, nondistended, no masses  Skin/Hair: no unusual rashes or bruises  Extremities: no edema, no cyanosis  Psychiatric:  Oriented to time, place, person and situation. Appropriate mood and affect    Pelvic Exam:  External Genitalia: normal appearance, hair distribution, (+) loss of bilateral labia (+) superficial raw area    Urethral Meatus:  normal in size, location, without lesions and prolapse  Bladder:  No fullness, masses or tenderness  Vagina:  Normal appearance without lesions, no abnormal discharge (+) cervix flushes (+) adhesions right cornua near cx  Cervix:  Normal without tenderness on motion  Uterus: normal in size, contour, position, mobility, without tenderness  Adnexa: normal without masses or tenderness  Perineum: normal  Rectovaginal: no masses, normal tone, guiac negative  Anus: no hemorroids    ASSESSMENT & PLAN:     Esha Barcenas was seen today for gyn exam and gyn problem. Diagnoses and all orders for this visit:    Encounter for gynecological examination with abnormal finding  -     GENITAL VAGINOSIS SCREEN; Future  -     THINPREP PAP SMEAR B; Future  -     HPV HIGH RISK , THIN PREP COLLECTION; Future  -     HPV HIGH RISK , THIN PREP COLLECTION  -     GENITAL VAGINOSIS SCREEN  -     THINPREP PAP SMEAR B    Visit for screening mammogram  -     [facility] BUTCH 2D+3D SCREENING BILAT (CPT=77067/21334); Future    Vulvar disorder    Acute vulvitis      Vag cx done -- if yeast, diflucan 150 po x 1  F/u for vulvar biopsy  Annual gyne exams    SUMMARY:    Pap: Next cotest today per ASCCP guidelines. Mammogram: ordered placed    BCM: Postmenopausal    STD screening: declines    Colon cancer screening: UTD    Misc: Calcium needs reviewed (1500 mg diet + supplement). Weight bearing exercise encouraged.  Call if any VB (if perimenopausal, reviewed abn VB patterns)    HM updated    Depression screen:   Depression Screening (PHQ-2/PHQ-9): Over the LAST 2 WEEKS   Little interest or pleasure in doing things: Not at all    Feeling down, depressed, or hopeless: Not at all    PHQ-2 SCORE: 0          FOLLOW-UP     Return for vulvar biopsy. Note to patient and family:  The [facility] makes medical notes available to patients in the interest of transparency. However, please be advised that this is a medical document. It is intended as a peer to peer communication. It is written in medical language and may contain abbreviations or verbiage that are technical and unfamiliar. It may appear blunt or direct. Medical documents are intended to carry relevant information, facts as evident, and the clinical opinion of the practitioner.

## 2023-06-02 LAB — HPV I/H RISK 1 DNA SPEC QL NAA+PROBE: NEGATIVE

## 2023-06-03 LAB
GENITAL VAGINOSIS SCREEN: NEGATIVE
TRICHOMONAS SCREEN: NEGATIVE

## 2023-06-06 ENCOUNTER — OFFICE VISIT (OUTPATIENT)
Dept: PHYSICAL THERAPY | Age: 58
End: 2023-06-06
Attending: FAMILY MEDICINE
Payer: COMMERCIAL

## 2023-06-06 PROCEDURE — 97110 THERAPEUTIC EXERCISES: CPT

## 2023-06-06 PROCEDURE — 97140 MANUAL THERAPY 1/> REGIONS: CPT

## 2023-07-21 ENCOUNTER — HOSPITAL ENCOUNTER (OUTPATIENT)
Dept: MAMMOGRAPHY | Age: 58
Discharge: HOME | End: 2023-07-21
Attending: OBSTETRICS & GYNECOLOGY
Payer: COMMERCIAL

## 2023-07-21 DIAGNOSIS — Z12.31 VISIT FOR SCREENING MAMMOGRAM: ICD-10-CM

## 2023-07-21 PROCEDURE — 77067 SCR MAMMO BI INCL CAD: CPT | Performed by: OBSTETRICS & GYNECOLOGY

## 2023-07-21 PROCEDURE — 77063 BREAST TOMOSYNTHESIS BI: CPT | Performed by: OBSTETRICS & GYNECOLOGY

## 2023-07-26 ENCOUNTER — OFFICE VISIT (OUTPATIENT)
Dept: OBGYN CLINIC | Facility: CLINIC | Age: 58
End: 2023-07-26

## 2023-07-26 VITALS
SYSTOLIC BLOOD PRESSURE: 107 MMHG | BODY MASS INDEX: 39 KG/M2 | DIASTOLIC BLOOD PRESSURE: 76 MMHG | HEART RATE: 71 BPM | WEIGHT: 198 LBS

## 2023-07-26 DIAGNOSIS — L98.9 DISORDER OF SKIN OR SUBCUTANEOUS TISSUE: ICD-10-CM

## 2023-07-26 DIAGNOSIS — N90.9 VULVAR DISORDER: Primary | ICD-10-CM

## 2023-07-26 PROCEDURE — 3078F DIAST BP <80 MM HG: CPT | Performed by: OBSTETRICS & GYNECOLOGY

## 2023-07-26 PROCEDURE — 56605 BIOPSY OF VULVA/PERINEUM: CPT | Performed by: OBSTETRICS & GYNECOLOGY

## 2023-07-26 PROCEDURE — 3074F SYST BP LT 130 MM HG: CPT | Performed by: OBSTETRICS & GYNECOLOGY

## 2023-07-26 RX ORDER — FLUTICASONE PROPIONATE 50 MCG
SPRAY, SUSPENSION (ML) NASAL
COMMUNITY
Start: 2023-06-13

## 2023-07-26 RX ORDER — METHYLPREDNISOLONE 4 MG/1
TABLET ORAL
COMMUNITY
Start: 2023-07-20

## 2023-07-28 NOTE — PROCEDURES
Vulvar Biopsy     Birth control method(s) used: Tubal Ligation    Consent signed. Procedure discussed with patient in detail including indication, risk, benefits, alternatives and complications. Lesion Description: ?superficial ulceration of labia & loss of labia minora w/ white line    Procedure:  Betadine wash of procedural site. 1% lidocaine without epinephrine used topically for local anesthesia. Vulvar biopsy done using 3 mm  key punch biopsy of area. Silver nitrate used to achieve hemostasis. Good hemostasis noted. Patient tolerated procedure well. Plan:  Site care discussed with patient. Neosporin twice a day. Follow-up in 2 weeks.

## 2023-08-08 NOTE — PROGRESS NOTES
Normal mammogram notification via [facility] sent
left sided abd pain hx of kidney stone states it feels the same. Motrin 1400 from 6-8am

## 2023-08-24 ENCOUNTER — OFFICE VISIT (OUTPATIENT)
Dept: OBGYN CLINIC | Facility: CLINIC | Age: 58
End: 2023-08-24

## 2023-08-24 VITALS — HEART RATE: 70 BPM | SYSTOLIC BLOOD PRESSURE: 125 MMHG | DIASTOLIC BLOOD PRESSURE: 81 MMHG

## 2023-08-24 DIAGNOSIS — L90.0 LICHEN SCLEROSUS: Primary | ICD-10-CM

## 2023-08-24 PROCEDURE — 99213 OFFICE O/P EST LOW 20 MIN: CPT | Performed by: OBSTETRICS & GYNECOLOGY

## 2023-08-24 PROCEDURE — 3074F SYST BP LT 130 MM HG: CPT | Performed by: OBSTETRICS & GYNECOLOGY

## 2023-08-24 PROCEDURE — 3079F DIAST BP 80-89 MM HG: CPT | Performed by: OBSTETRICS & GYNECOLOGY

## 2023-08-24 RX ORDER — CLOBETASOL PROPIONATE 0.5 MG/G
1 OINTMENT TOPICAL NIGHTLY
Qty: 60 G | Refills: 1 | Status: SHIPPED | OUTPATIENT
Start: 2023-08-24

## 2023-10-19 ENCOUNTER — OFFICE VISIT (OUTPATIENT)
Dept: PAIN CLINIC | Facility: HOSPITAL | Age: 58
End: 2023-10-19
Attending: STUDENT IN AN ORGANIZED HEALTH CARE EDUCATION/TRAINING PROGRAM
Payer: COMMERCIAL

## 2023-10-19 VITALS — DIASTOLIC BLOOD PRESSURE: 81 MMHG | HEART RATE: 71 BPM | SYSTOLIC BLOOD PRESSURE: 115 MMHG | OXYGEN SATURATION: 96 %

## 2023-10-19 DIAGNOSIS — M54.16 LUMBAR RADICULOPATHY: Primary | ICD-10-CM

## 2023-10-19 PROCEDURE — 99211 OFF/OP EST MAY X REQ PHY/QHP: CPT

## 2023-10-19 NOTE — CHRONIC PAIN
Follow-up Note  INTERVAL HISTORY:  Mrs Sue Suarez is seen today for follow up. She was seen earlier this year for right sided sciatica and underwent TFESI which was followed by a very significant pain relief, reported at 90%. She has been doing well until the last 2-3 weeks when she started feeling the pain coming back in the same distribution. She is here for evaluation and interested in undergoing a repeat injection. HISTORY OF PRESENT ILLNESS (from initial consultation on 3/02/2023): Lennox Joseph is a 63year old female referred to the pain clinic by Dr. Joi Pitts for low back pain with RLE radiculopathy. Patient started with RLE leg pain in the calf region which started 6 weeks ago with no IE. Patient started walking more than usual when she went to [facility] but other than that had no changes to her daily routine or health. She states that the pain is also in her buttocks region at this time. Patient was in [facility] when this started and she then went to [place] where she had an US to r/u DVT. Patient states the pain is worse with walking, sitting. At this pt patient is having challenges falling asleep at night 2/2 pain. She takes ibuprofen 800mg PRN which does not offer relief. Dr. Joi Pitts prescribed tylenol with codeine which is offering minimal. Patient endorses weakness in her RLE endorses numbness and tingling in her toes. Denies significant LBP at this time and denies any pain down the L side. PAIN COURSE AND PREVIOUS INTERVENTIONS:  Medications:  Tylenol #3, Lyrica (caused her to not act \"like herself\"), gabapentin (anxiety)  Interventions:  3/8/2023-RT TRANS L5/S1-KONCAREVIC  Adjuvants:  Biofreeze  ALLERGIES:  No Known Allergies  MEDICATION LIST:  Current Outpatient Medications   Medication Sig Dispense Refill    clobetasol 0.05 % External Ointment Apply 1 Application topically at bedtime.  60 g 1    fluticasone propionate 50 MCG/ACT Nasal Suspension       methylPREDNISolone 4 MG Oral Tablet Therapy Pack TAKE BY MOUTH AS DIRECTED PER PACKAGE INSTRUCTIONS      buPROPion HCl ER, XL, 150 MG Oral Tablet 24 Hr       Calcium Carbonate-Vitamin D (CALCIUM-D) 600-400 MG-UNIT Oral Tab Take by mouth. alendronate 35 MG Oral Tab Take 1 tablet (35 mg total) by mouth every 7 days. omeprazole 20 MG Oral Capsule Delayed Release Take 1 capsule (20 mg total) by mouth every morning before breakfast.      docusate sodium 100 MG Oral Cap Take 1 capsule (100 mg total) by mouth 2 (two) times daily. naproxen 500 MG Oral Tab   0    TraZODone HCl 100 MG Oral Tab       Sertraline HCl (ZOLOFT) 100 MG Oral Tab       Atorvastatin Calcium (LIPITOR) 10 MG Oral Tab       Cetirizine HCl (ZYRTEC ALLERGY) 10 MG Oral Cap Take  by mouth. REVIEW OF SYSTEMS:   Bowel/Bladder Incontinence: as above  Coughing/sneezing/straining does not exacerbate the pain. Numbness/tingling: as above  Weakness: as above  Weight Loss: Negative   Fever: Negative   Cardiovascular:  No current chest pain or palpitations   Respiratory:  No current shortness of breath   Gastrointestinal:  No active ulcer  Genitourinary:  Negative  Integumentary :  Negative  Psychiatric:  Negative  Hematologic: No active bleeding  Lymphatic: No current lymphedema  Allergic/Immunologic:  Negative  Musculoskeletal: As above  Neurological: As above  Denies chest pain, shortness of breath. MEDICAL HISTORY:  Patient Active Problem List:     Dyspepsia     Helicobacter positive gastritis     Gastric erythema     Lichen sclerosus    Past Medical History:   Diagnosis Date    Cholelithiasis     laparoscopic cholecystectomy 2648    Helicobacter positive gastritis 11/2017    treated and eradicated    High cholesterol     Hyperlipidemia 2004    Post-menopausal bleeding      bleed, neg endometrial biopsy 2013    Screen for colon cancer 08/2016    +polyp in 2022, repeat CLN in 2027.  +family hx of CRC     SURGICAL HISTORY:  Past Surgical History:   Procedure Laterality Date    BIOPSY OF UTERUS LINING      neg endometrial biopsy          x 2    CHOLECYSTECTOMY      EGD      LAPAROSCOPIC CHOLECYSTECTOMY      TUBAL LIGATION  2007    laparoscopic BTL     FAMILY HISTORY:  Family History   Problem Relation Age of Onset    Heart Disease Maternal Uncle         CAD    Cancer Other         spinal cord    Diabetes Mother     Hypertension Mother     Lipids Mother         hyperlipidemia    Hypertension Father     Colon Cancer Father     Lipids Brother         hyperlipidemia    Colon Cancer Paternal Grandfather 76    Cancer Maternal Aunt 60        pancreatic    Ovarian Cancer Paternal Aunt         45s    Breast Cancer Neg      SOCIAL HISTORY:  Social History    Socioeconomic History      Marital status:       Spouse name: Not on file      Number of children: Not on file      Years of education: Not on file      Highest education level: Not on file    Occupational History      Not on file    Tobacco Use      Smoking status: Never      Smokeless tobacco: Never    Substance and Sexual Activity      Alcohol use: No        Alcohol/week: 0.0 standard drinks of alcohol      Drug use: No      Sexual activity: Yes        Birth control/protection: Tubal Ligation    Other Topics      Concerns:         Service: Not Asked        Blood Transfusions: Not Asked        Caffeine Concern: Yes          2 cups soda daily        Occupational Exposure: Not Asked        Hobby Hazards: Not Asked        Sleep Concern: Not Asked        Stress Concern: Not Asked        Weight Concern: Not Asked        Special Diet: Not Asked        Back Care: Not Asked        Exercise: Not Asked        Bike Helmet: Not Asked        Seat Belt: Not Asked        Self-Exams: Not Asked    Social History Narrative      Not on file    Social Determinants of Health  Financial Resource Strain: Not on file  Food Insecurity: Not on file  Transportation Needs: Not on file  Physical Activity: Not on file  Stress: Not on file  Social Connections: Not on file  Housing Stability: Not on file  ADVANCE CARE PLANNING:    Advance Care Plan NOT discussed. PHYSICAL EXAMINATION:   10/19/23  1025   BP: 115/81   Pulse: 71      General: Alert and oriented x3, NAD, appears stated age, appropriate disposition and demeanor, answers questions appropriately   Head: normocephalic, atraumatic  Eyes: anicteric; no injection  Ears: no obvious deformities noted   Nose: externally grossly within normal limits, no unusual discharge or rhinorrhea   Throat: lips grossly within normal limits by visual exam externally  Neck: supple, trachea midline, no obvious JVD  Gait: Normal;  cane user - No  Spine: Normal  TPs: Absent: lumbo-sacral paraspinous muscle TPs  Skin - normal   IMAGING:  L4: bilateral pars defects  L4-L5: uncovering the disc due to anterolisthesis. Disc osteophyte complex slightly asymetric to the R. Suture arthorpathy of the pars defects. No canal stenosis. Moderate bilateral foraminal stenosis. L5-S1: disc oisteophyte complex which is relatively syymmetric. Superimposed L pararentral disc protrusion 15mm at its base and 4mm AP. Facet arthropathy. Mild canal stenosis with mildy accentuated left subarticular stenosis. Moderate to severe bilateral foraminal stenosis. [place] PHYSICIAN MONITORING PROGRAM REVIEWED  Yes    ASSESSMENT AND PLAN:    Gia Camp is a 63year old  female, with a couple of months of RLE radiculopathy due to lumbar degenerative disc disease as well as bilateral pars defects as well as anterolisthesis and stenosis who returns for a post-procedure follow up visit. Patient is s/p right TFESI L5-S1 on 3/8/2023 and reports 90% improvement in her RLE pain symptoms. She continues to have some persistent neuropathy in the RLE but did not tolerate gabapentin or Lyrica.      #Lumbar Radicular Syndrome, chronic worsening   -Symptoms in the L5 and S1 dermatomes  -Very good response with previous injection    -Will schedule a repeat L5 and S1 TFESI -Patient will bring the MRI disc to clinic before her injection   -Patient actively working with PT   -Follow up 2-4 weeks post-procedure    Total Time: 30 minutes   Comprehensive analgesic plan was formulated. Conservative vs. Aggressive measures were discussed at length including pharmacotherapy (eg. Anti- inflammatories, muscle relaxants, neuropathic medications, oral steroids, analgesics), injections, and further testing. Risks and benefits of all options were discussed at length to patients satisfaction during a comprehensive interactive discussion. All questions were answered during extended questions and answer session. Patient agreeable to discussion plan. Greater than 50% of the time was spent with counseling (nature of discussion centered around pain, therapy, and treatment options), face to face time, time spent reviewing data, obtaining patient information and discussing the care with the patients health care providers.      Angelo Freeman MD, 10/19/23, 11:07 AM

## 2023-10-19 NOTE — PROGRESS NOTES
PT presents ambulatory to the [facility].  Pt reports her RLBP with radiculopathy has returned. Constant 4-5/10 pain that increases with activity. Dr. Jeremy Ferris saw PT to discuss another injection. Refer to dictation  for POC.

## 2023-10-20 ENCOUNTER — TELEPHONE (OUTPATIENT)
Dept: PAIN CLINIC | Facility: HOSPITAL | Age: 58
End: 2023-10-20

## 2023-10-20 DIAGNOSIS — M54.16 LUMBAR RADICULOPATHY: Primary | ICD-10-CM

## 2023-10-20 NOTE — TELEPHONE ENCOUNTER
Scheduled procedure for: 11/02/23    Procedure follow-up for: 11/27/23 at 10 am.    Surgery scheduled in [address] [place] [address] Rd.

## 2023-10-20 NOTE — TELEPHONE ENCOUNTER
Right Transforaminal SILVIA L5 & S1 - Cpt [id number], [id number] - Dx R63.42 - NO PRECERT REQ    Called [facility] at [phone], spoke to Renzo [address], request above is authorized.    Call ref: 10/20/23 at 8:30 am

## 2023-11-16 ENCOUNTER — OFFICE VISIT (OUTPATIENT)
Dept: OBGYN CLINIC | Facility: CLINIC | Age: 58
End: 2023-11-16
Payer: COMMERCIAL

## 2023-11-16 VITALS — HEART RATE: 69 BPM | SYSTOLIC BLOOD PRESSURE: 103 MMHG | DIASTOLIC BLOOD PRESSURE: 69 MMHG

## 2023-11-16 DIAGNOSIS — L90.0 LICHEN SCLEROSUS: Primary | ICD-10-CM

## 2023-11-16 DIAGNOSIS — N76.2 ACUTE VULVITIS: ICD-10-CM

## 2023-11-16 PROCEDURE — 3078F DIAST BP <80 MM HG: CPT | Performed by: OBSTETRICS & GYNECOLOGY

## 2023-11-16 PROCEDURE — 99213 OFFICE O/P EST LOW 20 MIN: CPT | Performed by: OBSTETRICS & GYNECOLOGY

## 2023-11-16 PROCEDURE — 3074F SYST BP LT 130 MM HG: CPT | Performed by: OBSTETRICS & GYNECOLOGY

## 2023-11-16 RX ORDER — CLOBETASOL PROPIONATE 0.5 MG/G
1 OINTMENT TOPICAL
Qty: 60 G | Refills: 1 | Status: SHIPPED | OUTPATIENT
Start: 2023-11-17

## 2023-11-16 NOTE — PROGRESS NOTES
Jossie Scott is a 63year old female  No LMP recorded. Patient is postmenopausal.   Chief Complaint   Patient presents with    Gyn Problem     FOLLOW UP VISIT -- f/u on LS -- using clobetasol ointment nightly. Washing ointment off due to not liking greasy feel   . Increased burning today    OBSTETRICS HISTORY:  OB History    Para Term  AB Living   2 2 2 0 0 2   SAB IAB Ectopic Multiple Live Births   0 0 0 0 2       GYNE HISTORY:  Periods absent    History   Sexual Activity    Sexual activity: Yes    Birth control/ protection: Tubal Ligation       MEDICAL HISTORY:  Past Medical History:   Diagnosis Date    Bipolar affective ([facility] [address]     Cholelithiasis     laparoscopic cholecystectomy     Depression     Helicobacter positive gastritis 2017    treated and eradicated    High cholesterol     Hyperlipidemia     Post-menopausal bleeding      bleed, neg endometrial biopsy     Screen for colon cancer 2016    +polyp in , repeat CLN in .  +family hx of CRC    Visual impairment      Past Surgical History:   Procedure Laterality Date    BIOPSY OF UTERUS LINING      neg endometrial biopsy          x 2    CHOLECYSTECTOMY      EGD      LAPAROSCOPIC CHOLECYSTECTOMY      TUBAL LIGATION      laparoscopic BTL     OB History    Para Term  AB Living   2 2 2 0 0 2   SAB IAB Ectopic Multiple Live Births   0 0 0 0 2        SOCIAL HISTORY:  Social History     Socioeconomic History    Marital status:      Spouse name: Not on file    Number of children: Not on file    Years of education: Not on file    Highest education level: Not on file   Occupational History    Not on file   Tobacco Use    Smoking status: Never     Passive exposure: Never    Smokeless tobacco: Never   Substance and Sexual Activity    Alcohol use: No     Alcohol/week: 0.0 standard drinks of alcohol    Drug use: No    Sexual activity: Yes     Birth control/protection: Tubal Ligation Other Topics Concern     Service Not Asked    Blood Transfusions Not Asked    Caffeine Concern Yes     Comment: 2 cups soda daily    Occupational Exposure Not Asked    Hobby Hazards Not Asked    Sleep Concern Not Asked    Stress Concern Not Asked    Weight Concern Not Asked    Special Diet Not Asked    Back Care Not Asked    Exercise Not Asked    Bike Helmet Not Asked    Seat Belt Not Asked    Self-Exams Not Asked   Social History Narrative    Not on file     Social Determinants of Health     Financial Resource Strain: Not on file   Food Insecurity: Not on file   Transportation Needs: Not on file   Physical Activity: Not on file   Stress: Not on file   Social Connections: Not on file   Housing Stability: Not on file       MEDICATIONS:    Current Outpatient Medications:     [START ON 11/17/2023] clobetasol 0.05 % External Ointment, Apply 1 Application topically 3 (three) times a week. Every Monday / Shana Brocks / Friday night, Disp: 60 g, Rfl: 1    ibuprofen 800 MG Oral Tab, Take 800 mg by mouth every 6 (six) hours as needed for Pain., Disp: , Rfl:     fluticasone propionate 50 MCG/ACT Nasal Suspension, , Disp: , Rfl:     methylPREDNISolone 4 MG Oral Tablet Therapy Pack, TAKE BY MOUTH AS DIRECTED PER PACKAGE INSTRUCTIONS, Disp: , Rfl:     buPROPion HCl ER, XL, 150 MG Oral Tablet 24 Hr, , Disp: , Rfl:     Calcium Carbonate-Vitamin D (CALCIUM-D) 600-400 MG-UNIT Oral Tab, Take by mouth. (Patient not taking: Reported on 11/2/2023), Disp: , Rfl:     alendronate 35 MG Oral Tab, Take 1 tablet (35 mg total) by mouth every 7 days. , Disp: , Rfl:     omeprazole 20 MG Oral Capsule Delayed Release, Take 1 capsule (20 mg total) by mouth every morning before breakfast., Disp: , Rfl:     docusate sodium 100 MG Oral Cap, Take 1 capsule (100 mg total) by mouth 2 (two) times daily. , Disp: , Rfl:     naproxen 500 MG Oral Tab, , Disp: , Rfl: 0    TraZODone HCl 100 MG Oral Tab, , Disp: , Rfl:     Sertraline HCl (ZOLOFT) 100 MG Oral Tab, , Disp: , Rfl:     Atorvastatin Calcium (LIPITOR) 10 MG Oral Tab, , Disp: , Rfl:     Cetirizine HCl (ZYRTEC ALLERGY) 10 MG Oral Cap, Take  by mouth., Disp: , Rfl:     ALLERGIES:  No Known Allergies      Review of Systems:  Constitutional:    denies fatigue, night sweats, hot flashes  Cardiovascular:   denies chest pain or palpitations  Respiratory:    denies shortness of breath  Gastrointestinal:   denies heartburn, abdominal pain, diarrhea or constipation  Genitourinary:    denies dysuria, incontinence, abnormal vaginal discharge, vaginal itching  Musculoskeletal:   denies back pain. Skin/Breast:    denies any breast pain, lumps, or discharge. Neurological:    denies headaches, extremity weakness or numbness. Psychiatric:   denies depression or anxiety. Endocrine:     denies excessive thirst or urination. Heme/Lymph:    denies history of anemia, easy bruising or bleeding. PHYSICAL EXAM:   /69   Pulse 69   Constitutional:   well developed, well nourished  Head/Face:  normocephalic  Abdomen:    soft, nontender, nondistended, no masses  Skin/Hair:   no unusual rashes or bruises  Extremities:   no edema, no cyanosis  Psychiatric:    oriented to time, place, person and situation. Appropriate mood and affect    Pelvic Exam:  External Genitalia:  normal appearance, hair distribution, and no lesions  Urethral Meatus:   normal in size, location, without lesions and prolapse  Bladder:    no fullness, masses or tenderness  Vagina:    normal appearance without lesions, no abnormal discharge  Cervix:    normal without tenderness on motion  Uterus:   normal in size, contour, position, mobility, without tenderness  Adnexa:   normal without masses or tenderness  Perineum:   normal  Anus:    no hemorroids   Lymph node:   no inguinal lymph nodes    Assessment & Plan:    Samina Tucker was seen today for gyn problem.     Diagnoses and all orders for this visit:    Lichen sclerosus    Acute vulvitis    Other orders  - clobetasol 0.05 % External Ointment; Apply 1 Application topically 3 (three) times a week. Every Monday / Norah Dancer / Friday night        Requested Prescriptions     Signed Prescriptions Disp Refills    clobetasol 0.05 % External Ointment 60 g 1     Sig: Apply 1 Application topically 3 (three) times a week. Every Monday / Norah Dancer / Friday night       Vag cx done  Decrease clobetasol to 3x / week  Do not wash off      Spent total time 20 minutes on obtaining history / chart review, evaluating patient / performing medically appropriate exam, discussing treatment options, counseling / educating, and completing documentation, coordinating care.

## 2023-11-17 LAB
BV BACTERIA DNA VAG QL NAA+PROBE: NEGATIVE
C GLABRATA DNA VAG QL NAA+PROBE: NEGATIVE
C KRUSEI DNA VAG QL NAA+PROBE: NEGATIVE
CANDIDA DNA VAG QL NAA+PROBE: NEGATIVE
T VAGINALIS DNA VAG QL NAA+PROBE: NEGATIVE

## 2023-11-18 NOTE — PROGRESS NOTES
Jaylan Sewell. Your recent test done in the office was negative. Your Sx are likely due to your lichen sclerosis. Have a good day.

## 2023-11-27 ENCOUNTER — OFFICE VISIT (OUTPATIENT)
Dept: PAIN CLINIC | Facility: HOSPITAL | Age: 58
End: 2023-11-27
Attending: ANESTHESIOLOGY
Payer: COMMERCIAL

## 2023-11-27 VITALS
BODY MASS INDEX: 37.38 KG/M2 | OXYGEN SATURATION: 96 % | HEIGHT: 61 IN | SYSTOLIC BLOOD PRESSURE: 111 MMHG | HEART RATE: 76 BPM | DIASTOLIC BLOOD PRESSURE: 79 MMHG | WEIGHT: 198 LBS

## 2023-11-27 DIAGNOSIS — M54.16 LUMBAR RADICULOPATHY: Primary | ICD-10-CM

## 2023-11-27 PROCEDURE — 99211 OFF/OP EST MAY X REQ PHY/QHP: CPT

## 2023-11-27 NOTE — CHRONIC PAIN
Follow-up Note    Angela Hickman is a 63year old old female, originally referred to the pain clinic by Dr. Rommel Rios MD, with history of   1. Lumbar radiculopathy     returns to the clinic for follow up. Patient is complaining of low back pain radiating down to right lower extremity. Today pain is 1 out of 10. Range of pain is from 1 to 2 out of 10. For pain control patient presently is using ibuprofen as needed which helps. Pain Meds are reducing his pain by 50%. Tolerating well. No new symptoms. Patient has no change in bowel/bladder function. Patient had transforaminal epidurals injection November 2. She feels 90% improvement. Using no pain medicines    ALLERGIES:  No Known Allergies    MEDICATION LIST:  Current Outpatient Medications   Medication Sig Dispense Refill    clobetasol 0.05 % External Ointment Apply 1 Application topically 3 (three) times a week. Every Monday / Carbajal Baseman / Friday night 60 g 1    ibuprofen 800 MG Oral Tab Take 1 tablet (800 mg total) by mouth every 6 (six) hours as needed for Pain. fluticasone propionate 50 MCG/ACT Nasal Suspension       methylPREDNISolone 4 MG Oral Tablet Therapy Pack TAKE BY MOUTH AS DIRECTED PER PACKAGE INSTRUCTIONS      buPROPion HCl ER, XL, 150 MG Oral Tablet 24 Hr       Calcium Carbonate-Vitamin D (CALCIUM-D) 600-400 MG-UNIT Oral Tab Take by mouth. alendronate 35 MG Oral Tab Take 1 tablet (35 mg total) by mouth every 7 days. omeprazole 20 MG Oral Capsule Delayed Release Take 1 capsule (20 mg total) by mouth every morning before breakfast.      docusate sodium 100 MG Oral Cap Take 1 capsule (100 mg total) by mouth 2 (two) times daily. naproxen 500 MG Oral Tab   0    TraZODone HCl 100 MG Oral Tab       Sertraline HCl (ZOLOFT) 100 MG Oral Tab       Atorvastatin Calcium (LIPITOR) 10 MG Oral Tab       Cetirizine HCl (ZYRTEC ALLERGY) 10 MG Oral Cap Take  by mouth.           REVIEW OF SYSTEMS:   General: no weight change, change in appetite, thirst or fever  HEENT: no headache or blurred vision  Cardiopulmonary: no chest pain, palpitations, or shortness of breath  GI: no anorexia, nausea or vomiting, or bowel incontinence   : no dysuria, or pyuria. Endo: no goiter, lethargy, or heat/cold intolerance  Heme/Onc: no pallor, bruising, or bleeding. Musculoskeletal:  no new problems  Neuro:  no new problems  Psych:  no new problems    MEDICAL HISTORY:  Patient Active Problem List   Diagnosis    Dyspepsia    Helicobacter positive gastritis    Gastric erythema    Lichen sclerosus    Lumbar radiculopathy     Past Medical History:   Diagnosis Date    Bipolar affective ([facility] [address]     Cholelithiasis     laparoscopic cholecystectomy     Depression     Helicobacter positive gastritis 2017    treated and eradicated    High cholesterol     Hyperlipidemia 2004    Post-menopausal bleeding      bleed, neg endometrial biopsy     Screen for colon cancer 2016    +polyp in , repeat CLN in .  +family hx of CRC    Visual impairment        SURGICAL HISTORY:  Past Surgical History:   Procedure Laterality Date    BIOPSY OF UTERUS LINING      neg endometrial biopsy          x 2    CHOLECYSTECTOMY      EGD      LAPAROSCOPIC CHOLECYSTECTOMY      TUBAL LIGATION  2007    laparoscopic BTL       FAMILY HISTORY:  Family History   Problem Relation Age of Onset    Heart Disease Maternal Uncle         CAD    Cancer Other         spinal cord    Diabetes Mother     Hypertension Mother     Lipids Mother         hyperlipidemia    Hypertension Father     Colon Cancer Father     Lipids Brother         hyperlipidemia    Colon Cancer Paternal Grandfather 76    Cancer Maternal Aunt 60        pancreatic    Ovarian Cancer Paternal Aunt         45s    Breast Cancer Neg        SOCIAL HISTORY:  Social History     Socioeconomic History    Marital status:      Spouse name: Not on file    Number of children: Not on file    Years of education: Not on file Highest education level: Not on file   Occupational History    Not on file   Tobacco Use    Smoking status: Never     Passive exposure: Never    Smokeless tobacco: Never   Substance and Sexual Activity    Alcohol use: No     Alcohol/week: 0.0 standard drinks of alcohol    Drug use: No    Sexual activity: Yes     Birth control/protection: Tubal Ligation   Other Topics Concern     Service Not Asked    Blood Transfusions Not Asked    Caffeine Concern Yes     Comment: 2 cups soda daily    Occupational Exposure Not Asked    Hobby Hazards Not Asked    Sleep Concern Not Asked    Stress Concern Not Asked    Weight Concern Not Asked    Special Diet Not Asked    Back Care Not Asked    Exercise Not Asked    Bike Helmet Not Asked    Seat Belt Not Asked    Self-Exams Not Asked   Social History Narrative    Not on file     Social Determinants of Health     Financial Resource Strain: Not on file   Food Insecurity: Not on file   Transportation Needs: Not on file   Physical Activity: Not on file   Stress: Not on file   Social Connections: Not on file   Housing Stability: Not on file     PHYSICAL EXAMINATION:  Vitals:    11/27/23 1004   BP: 111/79   Pulse: 76      General: Alert and oriented x3, NAD, appears stated age, appropriate disposition and demeanor, answers questions appropriately   Head: normocephalic, atraumatic  Eyes: anicteric; no injection  Ears: normal;  no discharge  Nose: externally grossly within normal limits, no unusual discharge or rhinorrhea   Throat: lips grossly within normal limits by visual exam externally  Neck: supple, trachea midline, no obvious JVD  Gait: Walking without limping  Spine: Flattened lumbar lordosis. MOTOR EXAMINATION:  Lower Extremities: Normal bilaterally  REFLEXES:  Lower Extremities: Normal bilaterally  SENSATION (light touch/pinprick/temperature):    Lower Extremities: Bilaterally  SLR: Negative bilaterally  SIJ tenderness: Negative bilateral  Alecia's test: Negative bilaterally  TPs: Not identified    IMAGING:  No new imaging available    ASSESSMENT AND PLAN:    Malu Del Castillo is a 63year old  female, with     1. Lumbar radiculopathy    . Would recommend no injection. Continue ibuprofen as needed. If pain comes back patient needs to come back to pain clinic for reevaluation and possible scheduling for another injection. Pt will return to clinic as needed. Conservative vs. Interventional pain treatment options were discussed at length including pharmacotherapy (eg. Anti- inflammatories, muscle relaxants, neuropathic medications, oral steroids, analgesics), injections, and further testing. Risks and benefits of all options were discussed to patients satisfaction. All questions were answered. Patient agreeable to treatment plan. Greater than 50% of the time was spent with counseling (nature of discussion centered around pain, therapy, and treatment options), face to face time, time spent reviewing data, obtaining patient information and discussing the care with the patients health care providers.      Total Time: 20 minutes

## 2023-11-27 NOTE — PROGRESS NOTES
Pain Navigator    Pt presents ambulatory to the [facility].  Seen by Jeff  Here for a procedure f/u: Right TFESI L5/S1 - helped 90%    Rates pain a 1/10 today.

## 2024-07-11 ENCOUNTER — OFFICE VISIT (OUTPATIENT)
Dept: OBGYN CLINIC | Facility: CLINIC | Age: 59
End: 2024-07-11
Payer: COMMERCIAL

## 2024-07-11 VITALS
BODY MASS INDEX: 39 KG/M2 | DIASTOLIC BLOOD PRESSURE: 81 MMHG | HEART RATE: 72 BPM | SYSTOLIC BLOOD PRESSURE: 128 MMHG | WEIGHT: 207.63 LBS

## 2024-07-11 DIAGNOSIS — L90.0 LICHEN SCLEROSUS: ICD-10-CM

## 2024-07-11 DIAGNOSIS — Z01.411 ENCOUNTER FOR GYNECOLOGICAL EXAMINATION WITH ABNORMAL FINDING: Primary | ICD-10-CM

## 2024-07-11 DIAGNOSIS — Z12.31 VISIT FOR SCREENING MAMMOGRAM: ICD-10-CM

## 2024-07-11 DIAGNOSIS — Z76.0 MEDICATION REFILL: ICD-10-CM

## 2024-07-11 PROCEDURE — 99396 PREV VISIT EST AGE 40-64: CPT | Performed by: OBSTETRICS & GYNECOLOGY

## 2024-07-11 PROCEDURE — 3079F DIAST BP 80-89 MM HG: CPT | Performed by: OBSTETRICS & GYNECOLOGY

## 2024-07-11 PROCEDURE — 3074F SYST BP LT 130 MM HG: CPT | Performed by: OBSTETRICS & GYNECOLOGY

## 2024-07-11 RX ORDER — CLOBETASOL PROPIONATE 0.5 MG/G
1 OINTMENT TOPICAL
Qty: 60 G | Refills: 1 | Status: SHIPPED | OUTPATIENT
Start: 2024-07-12

## 2024-07-11 RX ORDER — CLOBETASOL PROPIONATE 0.5 MG/G
1 OINTMENT TOPICAL
Qty: 60 G | Refills: 1 | Status: SHIPPED | OUTPATIENT
Start: 2024-07-12 | End: 2024-07-11

## 2024-07-11 NOTE — PROGRESS NOTES
Melodie Barlow is a 59 year old female  No LMP recorded. Patient is postmenopausal.   Chief Complaint   Patient presents with    Gyn Exam     Using house in [place] x 6 months each year now    Medication Request     LS meds   .  She has no complaints.  Denies postmenopausal bleeding, urinary or sexual issues.  Use meds only at bottom area. No itching    OBSTETRICS HISTORY:     OB History    Para Term  AB Living   2 2 2     2   SAB IAB Ectopic Multiple Live Births           2      # Outcome Date GA Lbr Chuy/2nd Weight Sex Type Anes PTL Lv   2 Term     M CS-LVertical   JAYCEE   1 Term     F CS-LVertical   JAYCEE       GYNE HISTORY:     Periods none due to menopause        Latest Ref Rng & Units 2023    12:06 PM 2019    11:13 AM   RECENT PAP RESULTS   Thinprep Pap Negative for intraepithelial lesion or malignancy Negative for intraepithelial lesion or malignancy  Negative for intraepithelial lesion or malignancy    HPV Negative Negative  Negative          MEDICAL HISTORY:     Past Medical History:    Bipolar affective ([facility])    Cholelithiasis    laparoscopic cholecystectomy     Depression    Helicobacter positive gastritis    treated and eradicated    High cholesterol    Hyperlipidemia    Post-menopausal bleeding     bleed, neg endometrial biopsy     Screen for colon cancer    +polyp in , repeat CLN in . +family hx of CRC    Visual impairment     Past Surgical History:   Procedure Laterality Date    Biopsy of uterus lining      neg endometrial biopsy          x 2    Cholecystectomy      Egd      Laparoscopic cholecystectomy      Tubal ligation      laparoscopic BTL     OB History    Para Term  AB Living   2 2 2 0 0 2   SAB IAB Ectopic Multiple Live Births   0 0 0 0 2        SOCIAL HISTORY:     Social History     Socioeconomic History    Marital status:      Spouse name: Not on file    Number of children: Not on file    Years of education:  Not on file    Highest education level: Not on file   Occupational History    Not on file   Tobacco Use    Smoking status: Never     Passive exposure: Never    Smokeless tobacco: Never   Substance and Sexual Activity    Alcohol use: No     Alcohol/week: 0.0 standard drinks of alcohol    Drug use: No    Sexual activity: Yes     Birth control/protection: Tubal Ligation   Other Topics Concern     Service Not Asked    Blood Transfusions Not Asked    Caffeine Concern Yes     Comment: 2 cups soda daily    Occupational Exposure Not Asked    Hobby Hazards Not Asked    Sleep Concern Not Asked    Stress Concern Not Asked    Weight Concern Not Asked    Special Diet Not Asked    Back Care Not Asked    Exercise Not Asked    Bike Helmet Not Asked    Seat Belt Not Asked    Self-Exams Not Asked   Social History Narrative    Not on file     Social Determinants of Health     Financial Resource Strain: Not on file   Food Insecurity: Not on file   Transportation Needs: Not on file   Physical Activity: Not on file   Stress: Not on file   Social Connections: Not on file   Housing Stability: Not on file       FAMILY HISTORY:     Family History   Problem Relation Age of Onset    Heart Disease Maternal Uncle         CAD    Cancer Other         spinal cord    Diabetes Mother     Hypertension Mother     Lipids Mother         hyperlipidemia    Hypertension Father     Colon Cancer Father     Lipids Brother         hyperlipidemia    Colon Cancer Paternal Grandfather 75    Cancer Maternal Aunt 60        pancreatic    Ovarian Cancer Paternal Aunt         40s    Breast Cancer Neg        MEDICATIONS:       Current Outpatient Medications:     [START ON 7/12/2024] clobetasol 0.05 % External Ointment, Apply 1 Application topically 3 (three) times a week. Every Monday / Wedn / Friday night, Disp: 60 g, Rfl: 1    ibuprofen 800 MG Oral Tab, Take 1 tablet (800 mg total) by mouth every 6 (six) hours as needed for Pain., Disp: , Rfl:     fluticasone  propionate 50 MCG/ACT Nasal Suspension, , Disp: , Rfl:     buPROPion HCl ER, XL, 150 MG Oral Tablet 24 Hr, , Disp: , Rfl:     alendronate 35 MG Oral Tab, Take 1 tablet (35 mg total) by mouth every 7 days., Disp: , Rfl:     omeprazole 20 MG Oral Capsule Delayed Release, Take 1 capsule (20 mg total) by mouth every morning before breakfast., Disp: , Rfl:     docusate sodium 100 MG Oral Cap, Take 1 capsule (100 mg total) by mouth 2 (two) times daily., Disp: , Rfl:     naproxen 500 MG Oral Tab, , Disp: , Rfl: 0    TraZODone HCl 100 MG Oral Tab, , Disp: , Rfl:     Sertraline HCl (ZOLOFT) 100 MG Oral Tab, , Disp: , Rfl:     Atorvastatin Calcium (LIPITOR) 10 MG Oral Tab, , Disp: , Rfl:     Cetirizine HCl (ZYRTEC ALLERGY) 10 MG Oral Cap, Take  by mouth., Disp: , Rfl:     ALLERGIES:     No Known Allergies      REVIEW OF SYSTEMS:     Constitutional:    denies fever / chills  Eyes:     denies blurred or double vision  Cardiovascular:  denies chest pain or palpitations  Respiratory:    denies shortness of breath  Gastrointestinal:  denies severe abdominal pain, frequent diarrhea or constipation, nausea / vomiting  Genitourinary:    denies dysuria, bothersome incontinence  Skin/Breast:   denies any breast pain, lumps, or discharge  Neurological:    denies frequent severe headaches  Psychiatric:   denies depression or anxiety, thoughts of harming self or others  Heme/Lymph:    denies easy bruising or bleeding    PHYSICAL EXAM:   Blood pressure 128/81, pulse 72, weight 207 lb 9.6 oz (94.2 kg).  Constitutional:  well developed, well nourished  Head/Face:  normocephalic  Neck/Thyroid:  thyroid symmetric, no thyromegaly, no nodules, no adenopathy  Lymphatic: no abnormal supraclavicular or axillary adenopathy is noted  Breast:   normal without palpable masses, tenderness, asymmetry, nipple discharge, nipple retraction or skin changes  Respiratory:   nonlabored breathing  Cardiovascular: regular rate and rhythm  Abdomen:   soft,  nontender, nondistended, no masses  Skin/Hair: no unusual rashes or bruises  Extremities:  no edema, no cyanosis  Psychiatric:   Oriented to time, place, person and situation. Appropriate mood and affect    Pelvic Exam:  External Genitalia:  normal appearance, hair distribution, (+) loss of anatomy  Urethral Meatus:   normal in size, location, without lesions and prolapse  Bladder:    no fullness, masses or tenderness  Vagina:    normal appearance without lesions, no abnormal discharge  Cervix:    normal without tenderness on motion  Uterus:    Limited by girth  Adnexa:   Limited by girth  Perineum:   normal  Anus:    no hemorroids    ASSESSMENT & PLAN:     Melodie was seen today for gyn exam and medication request.    Diagnoses and all orders for this visit:    Encounter for gynecological examination with abnormal finding    Lichen sclerosus    Medication refill    Visit for screening mammogram  -     Mammogram Screen 3D Digital Bilateral; Future    Other orders  -     clobetasol 0.05 % External Ointment; Apply 1 Application topically 3 (three) times a week. Every Monday / Wedn / Friday night      Needs to use clobetasol on whole perineum area internally    SUMMARY:    Pap: Next cotest 6/26-28 per ASCCP guidelines.    Mammogram: ordered placed    BCM: Postmenopausal    STD screening: declines    Colon cancer screening: UTD    Misc: Calcium needs reviewed (1500 mg diet + supplement). Weight bearing exercise encouraged. Call if any VB (if perimenopausal, reviewed abn VB patterns)    HM updated    Depression screen:   Depression Screening (PHQ-2/PHQ-9): Over the LAST 2 WEEKS   Little interest or pleasure in doing things (over the last two weeks)?: Not at all    Feeling down, depressed, or hopeless (over the last two weeks)?: Not at all    PHQ-2 SCORE: 0          FOLLOW-UP     Return in about 1 year (around 7/11/2025) for annual gyne exam, medication refill.    Note to patient and family:  The 21st Century Cures Act  makes medical notes available to patients in the interest of transparency.  However, please be advised that this is a medical document.  It is intended as a peer to peer communication.  It is written in medical language and may contain abbreviations or verbiage that are technical and unfamiliar.  It may appear blunt or direct.  Medical documents are intended to carry relevant information, facts as evident, and the clinical opinion of the practitioner.

## 2024-07-16 ENCOUNTER — OFFICE VISIT (OUTPATIENT)
Dept: PAIN CLINIC | Facility: HOSPITAL | Age: 59
End: 2024-07-16
Attending: STUDENT IN AN ORGANIZED HEALTH CARE EDUCATION/TRAINING PROGRAM
Payer: COMMERCIAL

## 2024-07-16 ENCOUNTER — TELEPHONE (OUTPATIENT)
Dept: PAIN CLINIC | Facility: HOSPITAL | Age: 59
End: 2024-07-16

## 2024-07-16 VITALS
BODY MASS INDEX: 39 KG/M2 | HEART RATE: 68 BPM | WEIGHT: 207 LBS | DIASTOLIC BLOOD PRESSURE: 84 MMHG | SYSTOLIC BLOOD PRESSURE: 132 MMHG | OXYGEN SATURATION: 95 %

## 2024-07-16 DIAGNOSIS — M54.16 LUMBAR RADICULOPATHY: Primary | ICD-10-CM

## 2024-07-16 PROCEDURE — 99211 OFF/OP EST MAY X REQ PHY/QHP: CPT

## 2024-07-16 NOTE — TELEPHONE ENCOUNTER
[employer] portal   Musculoskeletal is grayed out - contact the number on patients insurance card to verify if prior authorization is required     [employer] office [phone]

## 2024-07-16 NOTE — CHRONIC PAIN
Follow-up Note  INTERVAL HISTORY:  Mrs Barlow is seen today for follow up. She was seen earlier this year for right sided sciatica and underwent TFESI which was followed by a very significant pain relief, reported at 90%. She has been doing well until the last 3-4 weeks when she started feeling the pain coming back in the same distribution. She is here for evaluation and interested in undergoing a repeat injection.     HISTORY OF PRESENT ILLNESS (from initial consultation on 3/02/2023): Melodie Barlow is a 58 year old female referred to the pain clinic by Dr. Salgado for low back pain with RLE radiculopathy. Patient started with RLE leg pain in the calf region which started 6 weeks ago with no IE. Patient started walking more than usual when she went to [place] but other than that had no changes to her daily routine or health. She states that the pain is also in her buttocks region at this time. Patient was in [place] when this started and she then went to [place] where she had an US to r/u DVT. Patient states the pain is worse with walking, sitting. At this pt patient is having challenges falling asleep at night 2/2 pain. She takes ibuprofen 800mg PRN which does not offer relief. Dr. Salgado prescribed tylenol with codeine which is offering minimal. Patient endorses weakness in her RLE endorses numbness and tingling in her toes. Denies significant LBP at this time and denies any pain down the L side.   PAIN COURSE AND PREVIOUS INTERVENTIONS:  Medications:  Tylenol #3, Lyrica (caused her to not act \"like herself\"), gabapentin (anxiety)  Interventions:  3/8/2023-RT TRANS L5/S1-KONCAREVIC  Adjuvants:  Biofreeze  ALLERGIES:  No Known Allergies  MEDICATION LIST:  Current Outpatient Medications   Medication Sig Dispense Refill    clobetasol 0.05 % External Ointment Apply 1 Application topically 3 (three) times a week. Every Monday / Wedn / Friday night 60 g 1    ibuprofen 800 MG Oral Tab Take 1 tablet (800 mg total) by mouth every 6  (six) hours as needed for Pain.      fluticasone propionate 50 MCG/ACT Nasal Suspension       buPROPion HCl ER, XL, 150 MG Oral Tablet 24 Hr       alendronate 35 MG Oral Tab Take 1 tablet (35 mg total) by mouth every 7 days.      omeprazole 20 MG Oral Capsule Delayed Release Take 1 capsule (20 mg total) by mouth every morning before breakfast.      docusate sodium 100 MG Oral Cap Take 1 capsule (100 mg total) by mouth 2 (two) times daily.      naproxen 500 MG Oral Tab   0    TraZODone HCl 100 MG Oral Tab       Sertraline HCl (ZOLOFT) 100 MG Oral Tab       Atorvastatin Calcium (LIPITOR) 10 MG Oral Tab       Cetirizine HCl (ZYRTEC ALLERGY) 10 MG Oral Cap Take  by mouth.        REVIEW OF SYSTEMS:   Bowel/Bladder Incontinence: as above  Coughing/sneezing/straining does not exacerbate the pain.  Numbness/tingling: as above  Weakness: as above  Weight Loss: Negative   Fever: Negative   Cardiovascular:  No current chest pain or palpitations   Respiratory:  No current shortness of breath   Gastrointestinal:  No active ulcer  Genitourinary:  Negative  Integumentary :  Negative  Psychiatric:  Negative  Hematologic: No active bleeding  Lymphatic: No current lymphedema  Allergic/Immunologic:  Negative  Musculoskeletal: As above  Neurological: As above  Denies chest pain, shortness of breath.  MEDICAL HISTORY:  Patient Active Problem List   Diagnosis    Dyspepsia    Helicobacter positive gastritis    Gastric erythema    Lichen sclerosus    Lumbar radiculopathy     Past Medical History:    Bipolar affective (HCC)    Cholelithiasis    laparoscopic cholecystectomy 1997    Depression    Helicobacter positive gastritis    treated and eradicated    High cholesterol    Hyperlipidemia    Post-menopausal bleeding     bleed, neg endometrial biopsy 2013    Screen for colon cancer    +polyp in 2022, repeat CLN in 2027. +family hx of CRC    Visual impairment     SURGICAL HISTORY:  Past Surgical History:   Procedure Laterality Date     Biopsy of uterus lining  2013    neg endometrial biopsy          x 2    Cholecystectomy      Egd      Laparoscopic cholecystectomy      Tubal ligation  2007    laparoscopic BTL     FAMILY HISTORY:  Family History   Problem Relation Age of Onset    Heart Disease Maternal Uncle         CAD    Cancer Other         spinal cord    Diabetes Mother     Hypertension Mother     Lipids Mother         hyperlipidemia    Hypertension Father     Colon Cancer Father     Lipids Brother         hyperlipidemia    Colon Cancer Paternal Grandfather 75    Cancer Maternal Aunt 60        pancreatic    Ovarian Cancer Paternal Aunt         40s    Breast Cancer Neg      SOCIAL HISTORY:  Social History     Socioeconomic History    Marital status:      Spouse name: Not on file    Number of children: Not on file    Years of education: Not on file    Highest education level: Not on file   Occupational History    Not on file   Tobacco Use    Smoking status: Never     Passive exposure: Never    Smokeless tobacco: Never   Substance and Sexual Activity    Alcohol use: No     Alcohol/week: 0.0 standard drinks of alcohol    Drug use: No    Sexual activity: Yes     Birth control/protection: Tubal Ligation   Other Topics Concern     Service Not Asked    Blood Transfusions Not Asked    Caffeine Concern Yes     Comment: 2 cups soda daily    Occupational Exposure Not Asked    Hobby Hazards Not Asked    Sleep Concern Not Asked    Stress Concern Not Asked    Weight Concern Not Asked    Special Diet Not Asked    Back Care Not Asked    Exercise Yes    Bike Helmet Not Asked    Seat Belt Not Asked    Self-Exams Not Asked   Social History Narrative    Not on file     Social Determinants of Health     Financial Resource Strain: Not on file   Food Insecurity: Not on file   Transportation Needs: Not on file   Physical Activity: Not on file   Stress: Not on file   Social Connections: Not on file   Housing Stability: Not on file     ADVANCE  CARE PLANNING:    Advance Care Plan NOT discussed.  PHYSICAL EXAMINATION:  Vitals:    07/16/24 1027   BP: 132/84   Pulse: 68      General: Alert and oriented x3, NAD, appears stated age, appropriate disposition and demeanor, answers questions appropriately   Head: normocephalic, atraumatic  Eyes: anicteric; no injection  Ears: no obvious deformities noted   Nose: externally grossly within normal limits, no unusual discharge or rhinorrhea   Throat: lips grossly within normal limits by visual exam externally  Neck: supple, trachea midline, no obvious JVD  Gait: Normal;  cane user - No  Spine: Normal  TPs: Absent: lumbo-sacral paraspinous muscle TPs  Skin - normal   IMAGING:  L4: bilateral pars defects  L4-L5: uncovering the disc due to anterolisthesis. Disc osteophyte complex slightly asymetric to the R. Suture arthorpathy of the pars defects. No canal stenosis. Moderate bilateral foraminal stenosis.   L5-S1: disc oisteophyte complex which is relatively syymmetric. Superimposed L pararentral disc protrusion 15mm at its base and 4mm AP. Facet arthropathy. Mild canal stenosis with mildy accentuated left subarticular stenosis. Moderate to severe bilateral foraminal stenosis.   [place] PHYSICIAN MONITORING PROGRAM REVIEWED  Yes    ASSESSMENT AND PLAN:    Melodie Barlow is a 59 year old  female, with a couple of months of RLE radiculopathy due to lumbar degenerative disc disease as well as bilateral pars defects as well as anterolisthesis and stenosis who returns for a post-procedure follow up visit. Patient is s/p right TFESI L5-S1 on 3/8/2023 and reports 90% improvement in her RLE pain symptoms. She continues to have some persistent neuropathy in the RLE but did not tolerate gabapentin or Lyrica.     #Lumbar Radicular Syndrome, chronic worsening   -Symptoms in the L5 and S1 dermatomes  -Very good response with previous injection  approaching 90% lasting 5-6 months  -Will schedule a repeat L5 and S1 TFESI    -Patient will bring  the MRI disc to clinic before her injection   -Patient actively working with PT   -Follow up 2-4 weeks post-procedure    Total Time: 30 minutes   Comprehensive analgesic plan was formulated. Conservative vs. Aggressive measures were discussed at length including pharmacotherapy (eg. Anti- inflammatories, muscle relaxants, neuropathic medications, oral steroids, analgesics), injections, and further testing. Risks and benefits of all options were discussed at length to patients satisfaction during a comprehensive interactive discussion. All questions were answered during extended questions and answer session. Patient agreeable to discussion plan. Greater than 50% of the time was spent with counseling (nature of discussion centered around pain, therapy, and treatment options), face to face time, time spent reviewing data, obtaining patient information and discussing the care with the patients health care providers.     Fredo Bird MD, 07/16/24, 12:05 PM

## 2024-07-16 NOTE — TELEPHONE ENCOUNTER
Prior authorization request completed for: Right Transforaminal LESI L5/S1   Authorization #NO PRIOR AUTHORIZATION IS REQUIRED  Pre-D: Not required  Exclusions/Restrictions: Based on medical necessity   Covered Benefit: Based on medical necessity  Authorization dates: N/A   CPT codes approved: 53743 79168   Number of visits/dates of service approved: 1  Physician: Pelon  Location: [facility]    Call Ref#: [id number]  Representative Name: Maeve LAY   Insurance Carrier: [employer] ([phone]) [employer] and [employer] ([phone])     Patient can be scheduled.

## 2024-07-16 NOTE — TELEPHONE ENCOUNTER
Spoke with patient     [facility]   Case placed and signed   Post procedure follow up scheduled with  for 08/02/2024 - 10:30am

## 2024-07-16 NOTE — PROGRESS NOTES
Patient presents in office today with reported pain in back with right sided radiculopathy and R foot numbness.     Current pain level reported = 5-6/10    Last reported dose of ibuprofen 800-  07.13.24      Narcotic Contract renewal NA    Pain started to increase in May while in [place]. Pain increases with ADL's, activity and laying.  Last seen 11.27.23 R Trans L5/S1 90%

## 2024-07-16 NOTE — PATIENT INSTRUCTIONS
Refill policies:    Allow 2-3 business days for refills; controlled substances may take longer.  Contact your pharmacy at least 5 days prior to running out of medication and have them send an electronic request or submit request through the “request refill” option in your [employer] account.  Refills are not addressed on weekends; covering physicians do not authorize routine medications on weekends.  No narcotics or controlled substances are refilled after noon on Fridays or by on call physicians.  By law, narcotics must be electronically prescribed.  A 30 day supply with no refills is the maximum allowed.  If your prescription is due for a refill, you may be due for a follow up appointment.  To best provide you care, patients receiving routine medications need to be seen at least once a year.  Patients receiving narcotic/controlled substance medications need to be seen at least once every 3 months.  In the event that your preferred pharmacy does not have the requested medication in stock (e.g. Backordered), it is your responsibility to find another pharmacy that has the requested medication available.  We will gladly send a new prescription to that pharmacy at your request.    Scheduling Tests:    If your physician has ordered radiology tests such as MRI or CT scans, please contact Central Scheduling at [phone] right away to schedule the test.  Once scheduled, the [facility] Centralized Referral Team will work with your insurance carrier to obtain pre-certification or prior authorization.  Depending on your insurance carrier, approval may take 3-10 days.  It is highly recommended patients assure they have received an authorization before having a test performed.  If test is done without insurance authorization, patient may be responsible for the entire amount billed.      Precertification and Prior Authorizations:  If your physician has recommended that you have a procedure or additional testing performed the [facility]  Centralized Referral Team will contact your insurance carrier to obtain pre-certification or prior authorization.    You are strongly encouraged to contact your insurance carrier to verify that your procedure/test has been approved and is a COVERED benefit.  Although the [facility] Centralized Referral Team does its due diligence, the insurance carrier gives the disclaimer that \"Although the procedure is authorized, this does not guarantee payment.\"    Ultimately the patient is responsible for payment.   Thank you for your understanding in this matter.  Paperwork Completion:  If you require FMLA or disability paperwork for your recovery, please make sure to either drop it off or have it faxed to our office at [phone]. Be sure the form has your name and date of birth on it.  The form will be faxed to our Forms Department and they will complete it for you.  There is a 25$ fee for all forms that need to be filled out.  Please be aware there is a 10-14 day turnaround time.  You will need to sign a release of information (DAYANA) form if your paperwork does not come with one.  You may call the Forms Department with any questions at [phone].  Their fax number is [phone].

## 2024-08-02 ENCOUNTER — OFFICE VISIT (OUTPATIENT)
Dept: PAIN CLINIC | Facility: HOSPITAL | Age: 59
End: 2024-08-02
Attending: STUDENT IN AN ORGANIZED HEALTH CARE EDUCATION/TRAINING PROGRAM
Payer: COMMERCIAL

## 2024-08-02 VITALS
WEIGHT: 207 LBS | OXYGEN SATURATION: 95 % | HEART RATE: 84 BPM | SYSTOLIC BLOOD PRESSURE: 117 MMHG | DIASTOLIC BLOOD PRESSURE: 81 MMHG | BODY MASS INDEX: 39 KG/M2

## 2024-08-02 DIAGNOSIS — M54.31 SCIATICA OF RIGHT SIDE: Primary | ICD-10-CM

## 2024-08-02 PROCEDURE — 99211 OFF/OP EST MAY X REQ PHY/QHP: CPT

## 2024-08-02 RX ORDER — PREGABALIN 50 MG/1
50 CAPSULE ORAL 2 TIMES DAILY
Qty: 60 CAPSULE | Refills: 1 | Status: SHIPPED | OUTPATIENT
Start: 2024-08-02 | End: 2024-10-01

## 2024-08-02 NOTE — PROGRESS NOTES
Last procedure: R trans L5/S1  07.18.24    Percentage of relief obtained: 70%    Duration of relief: constant starting the 2nd week.     Current Pain Score: 2-3/10    Narcotic Contract Exp: NA    Increased pain 1st week after the injection.

## 2024-08-02 NOTE — PATIENT INSTRUCTIONS
Refill policies:    Allow 2-3 business days for refills; controlled substances may take longer.  Contact your pharmacy at least 5 days prior to running out of medication and have them send an electronic request or submit request through the “request refill” option in your [employer] account.  Refills are not addressed on weekends; covering physicians do not authorize routine medications on weekends.  No narcotics or controlled substances are refilled after noon on Fridays or by on call physicians.  By law, narcotics must be electronically prescribed.  A 30 day supply with no refills is the maximum allowed.  If your prescription is due for a refill, you may be due for a follow up appointment.  To best provide you care, patients receiving routine medications need to be seen at least once a year.  Patients receiving narcotic/controlled substance medications need to be seen at least once every 3 months.  In the event that your preferred pharmacy does not have the requested medication in stock (e.g. Backordered), it is your responsibility to find another pharmacy that has the requested medication available.  We will gladly send a new prescription to that pharmacy at your request.    Scheduling Tests:    If your physician has ordered radiology tests such as MRI or CT scans, please contact Central Scheduling at [phone] right away to schedule the test.  Once scheduled, the [facility] Centralized Referral Team will work with your insurance carrier to obtain pre-certification or prior authorization.  Depending on your insurance carrier, approval may take 3-10 days.  It is highly recommended patients assure they have received an authorization before having a test performed.  If test is done without insurance authorization, patient may be responsible for the entire amount billed.      Precertification and Prior Authorizations:  If your physician has recommended that you have a procedure or additional testing performed the [facility]  Centralized Referral Team will contact your insurance carrier to obtain pre-certification or prior authorization.    You are strongly encouraged to contact your insurance carrier to verify that your procedure/test has been approved and is a COVERED benefit.  Although the [facility] Centralized Referral Team does its due diligence, the insurance carrier gives the disclaimer that \"Although the procedure is authorized, this does not guarantee payment.\"    Ultimately the patient is responsible for payment.   Thank you for your understanding in this matter.  Paperwork Completion:  If you require FMLA or disability paperwork for your recovery, please make sure to either drop it off or have it faxed to our office at [phone]. Be sure the form has your name and date of birth on it.  The form will be faxed to our Forms Department and they will complete it for you.  There is a 25$ fee for all forms that need to be filled out.  Please be aware there is a 10-14 day turnaround time.  You will need to sign a release of information (DAYANA) form if your paperwork does not come with one.  You may call the Forms Department with any questions at [phone].  Their fax number is [phone].

## 2024-08-02 NOTE — CHRONIC PAIN
Follow-up Note  Interval History:  Patient is seen today for follow up after TFESI reporting 70% pain relief, improved mobility, sleep quality and overall quality of life.       HISTORY OF PRESENT ILLNESS (from initial consultation on 3/02/2023): Melodie Barlow is a 58 year old female referred to the pain clinic by Dr. Salgado for low back pain with RLE radiculopathy. Patient started with RLE leg pain in the calf region which started 6 weeks ago with no IE. Patient started walking more than usual when she went to [place] but other than that had no changes to her daily routine or health. She states that the pain is also in her buttocks region at this time. Patient was in [place] when this started and she then went to [place] where she had an US to r/u DVT. Patient states the pain is worse with walking, sitting. At this pt patient is having challenges falling asleep at night 2/2 pain. She takes ibuprofen 800mg PRN which does not offer relief. Dr. Salgado prescribed tylenol with codeine which is offering minimal. Patient endorses weakness in her RLE endorses numbness and tingling in her toes. Denies significant LBP at this time and denies any pain down the L side.   PAIN COURSE AND PREVIOUS INTERVENTIONS:  Medications:  Tylenol #3, Lyrica (caused her to not act \"like herself\"), gabapentin (anxiety)  Interventions:  3/8/2023-RT TRANS L5/S1-KONCAREVIC  Adjuvants:  Biofreeze  ALLERGIES:  No Known Allergies  MEDICATION LIST:  Current Outpatient Medications   Medication Sig Dispense Refill    clobetasol 0.05 % External Ointment Apply 1 Application topically 3 (three) times a week. Every Monday / Wedn / Friday night 60 g 1    ibuprofen 800 MG Oral Tab Take 1 tablet (800 mg total) by mouth every 6 (six) hours as needed for Pain.      fluticasone propionate 50 MCG/ACT Nasal Suspension       buPROPion HCl ER, XL, 150 MG Oral Tablet 24 Hr       alendronate 35 MG Oral Tab Take 1 tablet (35 mg total) by mouth every 7 days.      omeprazole 20  MG Oral Capsule Delayed Release Take 1 capsule (20 mg total) by mouth every morning before breakfast.      docusate sodium 100 MG Oral Cap Take 1 capsule (100 mg total) by mouth 2 (two) times daily.      naproxen 500 MG Oral Tab   0    TraZODone HCl 100 MG Oral Tab       Sertraline HCl (ZOLOFT) 100 MG Oral Tab       Atorvastatin Calcium (LIPITOR) 10 MG Oral Tab       Cetirizine HCl (ZYRTEC ALLERGY) 10 MG Oral Cap Take  by mouth.        REVIEW OF SYSTEMS:   Bowel/Bladder Incontinence: as above  Coughing/sneezing/straining does not exacerbate the pain.  Numbness/tingling: as above  Weakness: as above  Weight Loss: Negative   Fever: Negative   Cardiovascular:  No current chest pain or palpitations   Respiratory:  No current shortness of breath   Gastrointestinal:  No active ulcer  Genitourinary:  Negative  Integumentary :  Negative  Psychiatric:  Negative  Hematologic: No active bleeding  Lymphatic: No current lymphedema  Allergic/Immunologic:  Negative  Musculoskeletal: As above  Neurological: As above  Denies chest pain, shortness of breath.  MEDICAL HISTORY:  Patient Active Problem List   Diagnosis    Dyspepsia    Helicobacter positive gastritis    Gastric erythema    Lichen sclerosus    Lumbar radiculopathy     Past Medical History:    Bipolar affective (HCC)    Cholelithiasis    laparoscopic cholecystectomy     Depression    Helicobacter positive gastritis    treated and eradicated    High cholesterol    Hyperlipidemia    Post-menopausal bleeding     bleed, neg endometrial biopsy     Screen for colon cancer    +polyp in , repeat CLN in . +family hx of CRC    Visual impairment     SURGICAL HISTORY:  Past Surgical History:   Procedure Laterality Date    Biopsy of uterus lining      neg endometrial biopsy          x 2    Cholecystectomy      Egd      Laparoscopic cholecystectomy      Tubal ligation  2007    laparoscopic BTL     FAMILY HISTORY:  Family History   Problem Relation Age  of Onset    Heart Disease Maternal Uncle         CAD    Cancer Other         spinal cord    Diabetes Mother     Hypertension Mother     Lipids Mother         hyperlipidemia    Hypertension Father     Colon Cancer Father     Lipids Brother         hyperlipidemia    Colon Cancer Paternal Grandfather 75    Cancer Maternal Aunt 60        pancreatic    Ovarian Cancer Paternal Aunt         40s    Breast Cancer Neg      SOCIAL HISTORY:  Social History     Socioeconomic History    Marital status:      Spouse name: Not on file    Number of children: Not on file    Years of education: Not on file    Highest education level: Not on file   Occupational History    Not on file   Tobacco Use    Smoking status: Never     Passive exposure: Never    Smokeless tobacco: Never   Substance and Sexual Activity    Alcohol use: No     Alcohol/week: 0.0 standard drinks of alcohol    Drug use: No    Sexual activity: Yes     Birth control/protection: Tubal Ligation   Other Topics Concern     Service Not Asked    Blood Transfusions Not Asked    Caffeine Concern Yes     Comment: 2 cups soda daily    Occupational Exposure Not Asked    Hobby Hazards Not Asked    Sleep Concern Not Asked    Stress Concern Not Asked    Weight Concern Not Asked    Special Diet Not Asked    Back Care Not Asked    Exercise Yes    Bike Helmet Not Asked    Seat Belt Not Asked    Self-Exams Not Asked   Social History Narrative    Not on file     Social Determinants of Health     Financial Resource Strain: Not on file   Food Insecurity: Not on file   Transportation Needs: Not on file   Physical Activity: Not on file   Stress: Not on file   Social Connections: Not on file   Housing Stability: Not on file     ADVANCE CARE PLANNING:    Advance Care Plan NOT discussed.  PHYSICAL EXAMINATION:  Vitals:    08/02/24 1026   BP: 117/81   Pulse: 84      General: Alert and oriented x3, NAD, appears stated age, appropriate disposition and demeanor, answers questions  appropriately   Head: normocephalic, atraumatic  Eyes: anicteric; no injection  Ears: no obvious deformities noted   Nose: externally grossly within normal limits, no unusual discharge or rhinorrhea   Throat: lips grossly within normal limits by visual exam externally  Neck: supple, trachea midline, no obvious JVD  Gait: Normal;  cane user - No  Spine: Normal  TPs: Absent: lumbo-sacral paraspinous muscle TPs  Skin - normal   IMAGING:  L4: bilateral pars defects  L4-L5: uncovering the disc due to anterolisthesis. Disc osteophyte complex slightly asymetric to the R. Suture arthorpathy of the pars defects. No canal stenosis. Moderate bilateral foraminal stenosis.   L5-S1: disc oisteophyte complex which is relatively syymmetric. Superimposed L pararentral disc protrusion 15mm at its base and 4mm AP. Facet arthropathy. Mild canal stenosis with mildy accentuated left subarticular stenosis. Moderate to severe bilateral foraminal stenosis.   [place] PHYSICIAN MONITORING PROGRAM REVIEWED  Yes    ASSESSMENT AND PLAN:    Melodie Barlow is a 59 year old  female, with a couple of months of RLE radiculopathy due to lumbar degenerative disc disease as well as bilateral pars defects as well as anterolisthesis and stenosis who returns for a post-procedure follow up visit. Patient is s/p right TFESI L5-S1 on 3/8/2023 and reports 90% improvement in her RLE pain symptoms. She continues to have some persistent neuropathy in the RLE but did not tolerate gabapentin or Lyrica.     #Lumbar Radicular Syndrome, chronic worsening   -Symptoms in the L5 and S1 dermatomes  -Very good response with previous injection  approaching 90% lasting 5-6 months  -s/p repeat L5 and S1 TFESI with 70% pain relief   -Residual tingling, will trial a course of lyrica   -To discuss with psych provider about changing sertraline to duloxetine   -Follow up in 4-6 weeks    Total Time: 30 minutes   Comprehensive analgesic plan was formulated. Conservative vs. Aggressive  measures were discussed at length including pharmacotherapy (eg. Anti- inflammatories, muscle relaxants, neuropathic medications, oral steroids, analgesics), injections, and further testing. Risks and benefits of all options were discussed at length to patients satisfaction during a comprehensive interactive discussion. All questions were answered during extended questions and answer session. Patient agreeable to discussion plan. Greater than 50% of the time was spent with counseling (nature of discussion centered around pain, therapy, and treatment options), face to face time, time spent reviewing data, obtaining patient information and discussing the care with the patients health care providers.     Fredo Bird MD, 08/02/24, 11:24 AM

## 2024-08-16 ENCOUNTER — HOSPITAL ENCOUNTER (OUTPATIENT)
Dept: MAMMOGRAPHY | Age: 59
Discharge: HOME | End: 2024-08-16
Attending: OBSTETRICS & GYNECOLOGY
Payer: COMMERCIAL

## 2024-08-16 DIAGNOSIS — Z12.31 VISIT FOR SCREENING MAMMOGRAM: ICD-10-CM

## 2024-08-16 PROCEDURE — 77063 BREAST TOMOSYNTHESIS BI: CPT | Performed by: OBSTETRICS & GYNECOLOGY

## 2024-08-16 PROCEDURE — 77067 SCR MAMMO BI INCL CAD: CPT | Performed by: OBSTETRICS & GYNECOLOGY

## 2024-10-17 ENCOUNTER — OFFICE VISIT (OUTPATIENT)
Dept: PAIN CLINIC | Facility: HOSPITAL | Age: 59
End: 2024-10-17
Attending: STUDENT IN AN ORGANIZED HEALTH CARE EDUCATION/TRAINING PROGRAM
Payer: COMMERCIAL

## 2024-10-17 ENCOUNTER — HOSPITAL ENCOUNTER (OUTPATIENT)
Dept: GENERAL RADIOLOGY | Facility: HOSPITAL | Age: 59
Discharge: HOME | End: 2024-10-17
Attending: STUDENT IN AN ORGANIZED HEALTH CARE EDUCATION/TRAINING PROGRAM
Payer: COMMERCIAL

## 2024-10-17 VITALS
HEART RATE: 66 BPM | DIASTOLIC BLOOD PRESSURE: 83 MMHG | SYSTOLIC BLOOD PRESSURE: 121 MMHG | OXYGEN SATURATION: 94 % | BODY MASS INDEX: 39 KG/M2 | WEIGHT: 207 LBS

## 2024-10-17 DIAGNOSIS — M47.816 LUMBAR SPONDYLOSIS: Primary | ICD-10-CM

## 2024-10-17 DIAGNOSIS — M47.816 LUMBAR SPONDYLOSIS: ICD-10-CM

## 2024-10-17 DIAGNOSIS — M54.31 SCIATICA OF RIGHT SIDE: ICD-10-CM

## 2024-10-17 PROCEDURE — 72110 X-RAY EXAM L-2 SPINE 4/>VWS: CPT | Performed by: STUDENT IN AN ORGANIZED HEALTH CARE EDUCATION/TRAINING PROGRAM

## 2024-10-17 PROCEDURE — 99211 OFF/OP EST MAY X REQ PHY/QHP: CPT

## 2024-10-17 RX ORDER — PREGABALIN 50 MG/1
50 CAPSULE ORAL 2 TIMES DAILY
Qty: 60 CAPSULE | Refills: 1 | Status: SHIPPED | OUTPATIENT
Start: 2024-10-17 | End: 2024-12-16

## 2024-10-17 NOTE — CHRONIC PAIN
Follow-up Note  Interval History:  Patient is seen today for follow up. Her leg pain is still under control but she is experiencing worsening axial low back pain especially with prolonged standing or walking.     HISTORY OF PRESENT ILLNESS (from initial consultation on 3/02/2023): Melodie Barlow is a 58 year old female referred to the pain clinic by Dr. Salgado for low back pain with RLE radiculopathy. Patient started with RLE leg pain in the calf region which started 6 weeks ago with no IE. Patient started walking more than usual when she went to [place] but other than that had no changes to her daily routine or health. She states that the pain is also in her buttocks region at this time. Patient was in [place] when this started and she then went to [place] where she had an US to r/u DVT. Patient states the pain is worse with walking, sitting. At this pt patient is having challenges falling asleep at night 2/2 pain. She takes ibuprofen 800mg PRN which does not offer relief. Dr. Salgado prescribed tylenol with codeine which is offering minimal. Patient endorses weakness in her RLE endorses numbness and tingling in her toes. Denies significant LBP at this time and denies any pain down the L side.   PAIN COURSE AND PREVIOUS INTERVENTIONS:  Medications:  Tylenol #3, Lyrica (caused her to not act \"like herself\"), gabapentin (anxiety)  Interventions:  3/8/2023-RT TRANS L5/S1-KONCAREVIC  Adjuvants:  Biofreeze  ALLERGIES:  No Known Allergies  MEDICATION LIST:  Current Outpatient Medications   Medication Sig Dispense Refill    clobetasol 0.05 % External Ointment Apply 1 Application topically 3 (three) times a week. Every Monday / Wedn / Friday night 60 g 1    ibuprofen 800 MG Oral Tab Take 1 tablet (800 mg total) by mouth every 6 (six) hours as needed for Pain.      fluticasone propionate 50 MCG/ACT Nasal Suspension       buPROPion HCl ER, XL, 150 MG Oral Tablet 24 Hr       alendronate 35 MG Oral Tab Take 1 tablet (35 mg total) by  mouth every 7 days.      omeprazole 20 MG Oral Capsule Delayed Release Take 1 capsule (20 mg total) by mouth every morning before breakfast.      docusate sodium 100 MG Oral Cap Take 1 capsule (100 mg total) by mouth 2 (two) times daily.      naproxen 500 MG Oral Tab   0    TraZODone HCl 100 MG Oral Tab       Sertraline HCl (ZOLOFT) 100 MG Oral Tab       Atorvastatin Calcium (LIPITOR) 10 MG Oral Tab       Cetirizine HCl (ZYRTEC ALLERGY) 10 MG Oral Cap Take  by mouth.        REVIEW OF SYSTEMS:   Bowel/Bladder Incontinence: as above  Coughing/sneezing/straining does not exacerbate the pain.  Numbness/tingling: as above  Weakness: as above  Weight Loss: Negative   Fever: Negative   Cardiovascular:  No current chest pain or palpitations   Respiratory:  No current shortness of breath   Gastrointestinal:  No active ulcer  Genitourinary:  Negative  Integumentary :  Negative  Psychiatric:  Negative  Hematologic: No active bleeding  Lymphatic: No current lymphedema  Allergic/Immunologic:  Negative  Musculoskeletal: As above  Neurological: As above  Denies chest pain, shortness of breath.  MEDICAL HISTORY:  Patient Active Problem List   Diagnosis    Dyspepsia    Helicobacter positive gastritis    Gastric erythema    Lichen sclerosus    Lumbar radiculopathy     Past Medical History:    Bipolar affective (HCC)    Cholelithiasis    laparoscopic cholecystectomy     Depression    Helicobacter positive gastritis    treated and eradicated    High cholesterol    Hyperlipidemia    Post-menopausal bleeding     bleed, neg endometrial biopsy     Screen for colon cancer    +polyp in , repeat CLN in . +family hx of CRC    Visual impairment     SURGICAL HISTORY:  Past Surgical History:   Procedure Laterality Date    Biopsy of uterus lining      neg endometrial biopsy          x 2    Cholecystectomy      Egd      Laparoscopic cholecystectomy      Tubal ligation  2007    laparoscopic BTL     FAMILY  HISTORY:  Family History   Problem Relation Age of Onset    Heart Disease Maternal Uncle         CAD    Cancer Other         spinal cord    Diabetes Mother     Hypertension Mother     Lipids Mother         hyperlipidemia    Hypertension Father     Colon Cancer Father     Lipids Brother         hyperlipidemia    Colon Cancer Paternal Grandfather 75    Cancer Maternal Aunt 60        pancreatic    Ovarian Cancer Paternal Aunt         40s    Breast Cancer Neg      SOCIAL HISTORY:  Social History     Socioeconomic History    Marital status:      Spouse name: Not on file    Number of children: Not on file    Years of education: Not on file    Highest education level: Not on file   Occupational History    Not on file   Tobacco Use    Smoking status: Never     Passive exposure: Never    Smokeless tobacco: Never   Substance and Sexual Activity    Alcohol use: No     Alcohol/week: 0.0 standard drinks of alcohol    Drug use: No    Sexual activity: Yes     Birth control/protection: Tubal Ligation   Other Topics Concern     Service Not Asked    Blood Transfusions Not Asked    Caffeine Concern Yes     Comment: 2 cups soda daily    Occupational Exposure Not Asked    Hobby Hazards Not Asked    Sleep Concern Not Asked    Stress Concern Not Asked    Weight Concern Not Asked    Special Diet Not Asked    Back Care Not Asked    Exercise Yes    Bike Helmet Not Asked    Seat Belt Not Asked    Self-Exams Not Asked   Social History Narrative    Not on file     Social Drivers of Health     Financial Resource Strain: Not on file   Food Insecurity: Not on file   Transportation Needs: Not on file   Physical Activity: Not on file   Stress: Not on file   Social Connections: Not on file   Housing Stability: Not on file     ADVANCE CARE PLANNING:    Advance Care Plan NOT discussed.  PHYSICAL EXAMINATION:  Vitals:    10/17/24 1034   BP: 121/83   Pulse: 66      Review of Systems:  CONSTITUTIONAL: denies fevers, chills  HEENT: denies  swallowing difficulties, sore throat  CARDIOVASCULAR: denies chest pain, palpitations, syncope  RESPIRATORY: denies shortness of breath, cough, wheezing  GI: denies change in bowel habits, nausea, vomiting  : denies change in bladder function, frequency, dysuria  SKIN: denies rash, skin changes  MSK: Per HPI  NEURO: Per HPI  PSYCH: Per HPI      General Physical Exam:    Constitutional  Oriented to person, place, and time. Appears well-developed and   well-nourished  Head    Normocephalic and atraumatic.  Eyes    Pupils are equal, round, and reactive to light.  Neck    Neck supple  Cardiovascular  Minimal to no peripheral edema, intact distal pulses  Pulmonary/Chest  Effort normal, no shortness of breath noted  Neurological   Alert and oriented to person, place, and time  Skin    Skin is warm and dry  Psychiatric   Normal mood and affect, behavior and judgment    Neurologic & Musculoskeletal Exam    Lumbar    Region Exam Right (+/-) Left  (+/-)   Lumbar Musculature Tender w/ palpation - -   Lumbar Facet Pain w/ extension ++ ++         Lower Extremity SLR - -   Lower Extremity Crossed SLR - -       Sensation Right Left   L2: Proximal Anterior Thigh Normal Normal   L3: Mid Anterior Thigh Normal Normal   L4: Medial leg/foot, great toe (Saphenous n.) Normal Normal   L5: Dorsum of mid foot Normal Normal   S1: Lateral leg/foot, little toe, back of leg (Sural n.) Normal Normal       Motor Strength Right Left   L2:  5/5 5/5   L3:  5/5 5/5   L4:  5/5 5/5   L5:  5/5 5/5   S1:  5/5 5/5       Reflexes Right Left   L4: Patellar 2/4 2/4   S1: Achilles 2/4 2/4   Babinski Absent Absent   Clonus Absent Absent       Sacroiliac Joint    Exam Right Left   Alejandro's Finger (PSIS) - -   MONICA - -   Gaenslen's - -   Compression - -   Distraction - -       Hip    Exam Right Left   FADIR - -   Greater Trochanter tenderness - -   Piriformis tenderness - -       Knee    Exam Right Left   Palpation tender - -   Effusion - -   ACL/PCL - -    Valgus/Varus - -   Corinne's - -     IMAGING:  L4: bilateral pars defects  L4-L5: uncovering the disc due to anterolisthesis. Disc osteophyte complex slightly asymetric to the R. Suture arthorpathy of the pars defects. No canal stenosis. Moderate bilateral foraminal stenosis.   L5-S1: disc oisteophyte complex which is relatively syymmetric. Superimposed L pararentral disc protrusion 15mm at its base and 4mm AP. Facet arthropathy. Mild canal stenosis with mildy accentuated left subarticular stenosis. Moderate to severe bilateral foraminal stenosis.   [place] PHYSICIAN MONITORING PROGRAM REVIEWED  Yes    ASSESSMENT AND PLAN:    Melodie Barlow is a 59 year old  female, with a couple of months of RLE radiculopathy due to lumbar degenerative disc disease as well as bilateral pars defects as well as anterolisthesis and stenosis who returns for a post-procedure follow up visit. Patient is s/p right TFESI L5-S1 on 3/8/2023 and reports 90% improvement in her RLE pain symptoms. She continues to have some persistent neuropathy in the RLE but did not tolerate gabapentin or Lyrica.     #Axial Low Back Pain, chronic worsening   #Lumbar Spondylosis  #Facetogenic pain  -Will trial a bilat L4, L5, ALA mbb.  -If successful, will confirm with second diagnostic block then procedure to RFA      Total Time: 30 minutes   Comprehensive analgesic plan was formulated. Conservative vs. Aggressive measures were discussed at length including pharmacotherapy (eg. Anti- inflammatories, muscle relaxants, neuropathic medications, oral steroids, analgesics), injections, and further testing. Risks and benefits of all options were discussed at length to patients satisfaction during a comprehensive interactive discussion. All questions were answered during extended questions and answer session. Patient agreeable to discussion plan. Greater than 50% of the time was spent with counseling (nature of discussion centered around pain, therapy, and treatment  options), face to face time, time spent reviewing data, obtaining patient information and discussing the care with the patients health care providers.     Fredo Bird MD, 10/17/24, 2:39 PM

## 2024-10-17 NOTE — PROGRESS NOTES
Patient presents in office today with reported pain in lower back with bilateral radiculopathy. L>R    Current pain level reported = 5/10    Last reported dose of Pregabalin last night.       Narcotic Contract renewal NA

## 2024-10-17 NOTE — PATIENT INSTRUCTIONS
Refill policies:    Allow 2-3 business days for refills; controlled substances may take longer.  Contact your pharmacy at least 5 days prior to running out of medication and have them send an electronic request or submit request through the “request refill” option in your [employer] account.  Refills are not addressed on weekends; covering physicians do not authorize routine medications on weekends.  No narcotics or controlled substances are refilled after noon on Fridays or by on call physicians.  By law, narcotics must be electronically prescribed.  A 30 day supply with no refills is the maximum allowed.  If your prescription is due for a refill, you may be due for a follow up appointment.  To best provide you care, patients receiving routine medications need to be seen at least once a year.  Patients receiving narcotic/controlled substance medications need to be seen at least once every 3 months.  In the event that your preferred pharmacy does not have the requested medication in stock (e.g. Backordered), it is your responsibility to find another pharmacy that has the requested medication available.  We will gladly send a new prescription to that pharmacy at your request.    Scheduling Tests:    If your physician has ordered radiology tests such as MRI or CT scans, please contact Central Scheduling at [phone] right away to schedule the test.  Once scheduled, the [facility] Centralized Referral Team will work with your insurance carrier to obtain pre-certification or prior authorization.  Depending on your insurance carrier, approval may take 3-10 days.  It is highly recommended patients assure they have received an authorization before having a test performed.  If test is done without insurance authorization, patient may be responsible for the entire amount billed.      Precertification and Prior Authorizations:  If your physician has recommended that you have a procedure or additional testing performed the [facility]  Centralized Referral Team will contact your insurance carrier to obtain pre-certification or prior authorization.    You are strongly encouraged to contact your insurance carrier to verify that your procedure/test has been approved and is a COVERED benefit.  Although the [facility] Centralized Referral Team does its due diligence, the insurance carrier gives the disclaimer that \"Although the procedure is authorized, this does not guarantee payment.\"    Ultimately the patient is responsible for payment.   Thank you for your understanding in this matter.  Paperwork Completion:  If you require FMLA or disability paperwork for your recovery, please make sure to either drop it off or have it faxed to our office at [phone]. Be sure the form has your name and date of birth on it.  The form will be faxed to our Forms Department and they will complete it for you.  There is a 25$ fee for all forms that need to be filled out.  Please be aware there is a 10-14 day turnaround time.  You will need to sign a release of information (DAYANA) form if your paperwork does not come with one.  You may call the Forms Department with any questions at [phone].  Their fax number is [phone].

## 2024-10-18 ENCOUNTER — TELEPHONE (OUTPATIENT)
Dept: PAIN CLINIC | Facility: HOSPITAL | Age: 59
End: 2024-10-18

## 2024-10-18 NOTE — TELEPHONE ENCOUNTER
Spoke with Ritu at [facility] [employer] (phone #: [phone])   Per Ritu - prior authorization is required for MBB through [facility].     Prior Authorization for Bilateral L4/5,L5/S1 MBB initiated with Kashif DOCKERY at [facility], (phone #: [phone]).  CPT codes: 01809,22028  Provider : [facility]  Location : [facility]  Request for injection pending review, no pending case # -refer case to patient's Name & . Clinical notes faxed to RN Reviewer Magaly at Fax #: [phone], confirmation received.     *RN Reviewer assigned to PA case is Magaly LUCIO   Phone #: [phone]  Fax #: [phone]

## 2024-12-10 ENCOUNTER — OFFICE VISIT (OUTPATIENT)
Dept: PAIN CLINIC | Facility: HOSPITAL | Age: 59
End: 2024-12-10
Attending: STUDENT IN AN ORGANIZED HEALTH CARE EDUCATION/TRAINING PROGRAM
Payer: COMMERCIAL

## 2024-12-10 VITALS — SYSTOLIC BLOOD PRESSURE: 121 MMHG | HEART RATE: 64 BPM | OXYGEN SATURATION: 96 % | DIASTOLIC BLOOD PRESSURE: 85 MMHG

## 2024-12-10 DIAGNOSIS — M54.31 SCIATICA OF RIGHT SIDE: ICD-10-CM

## 2024-12-10 PROCEDURE — 99213 OFFICE O/P EST LOW 20 MIN: CPT | Performed by: STUDENT IN AN ORGANIZED HEALTH CARE EDUCATION/TRAINING PROGRAM

## 2024-12-10 RX ORDER — PREGABALIN 50 MG/1
50 CAPSULE ORAL 2 TIMES DAILY
Qty: 60 CAPSULE | Refills: 1 | Status: SHIPPED | OUTPATIENT
Start: 2024-12-10 | End: 2025-02-08

## 2024-12-10 NOTE — CHRONIC PAIN
Follow-up Note  Interval History:  Patient is seen today for follow up following LMBB reporting minimal relief.     HISTORY OF PRESENT ILLNESS (from initial consultation on 3/02/2023): Melodie Barlow is a 58 year old female referred to the pain clinic by Dr. Salgado for low back pain with RLE radiculopathy. Patient started with RLE leg pain in the calf region which started 6 weeks ago with no IE. Patient started walking more than usual when she went to [place] but other than that had no changes to her daily routine or health. She states that the pain is also in her buttocks region at this time. Patient was in [place] when this started and she then went to [place] where she had an US to r/u DVT. Patient states the pain is worse with walking, sitting. At this pt patient is having challenges falling asleep at night 2/2 pain. She takes ibuprofen 800mg PRN which does not offer relief. Dr. Salgado prescribed tylenol with codeine which is offering minimal. Patient endorses weakness in her RLE endorses numbness and tingling in her toes. Denies significant LBP at this time and denies any pain down the L side.   PAIN COURSE AND PREVIOUS INTERVENTIONS:  Medications:  Tylenol #3, Lyrica (caused her to not act \"like herself\"), gabapentin (anxiety)  Interventions:  3/8/2023-RT TRANS L5/S1-KONCAREVIC  Adjuvants:  Biofreeze  ALLERGIES:  No Known Allergies  MEDICATION LIST:  Current Outpatient Medications   Medication Sig Dispense Refill    pregabalin 50 MG Oral Cap Take 1 capsule (50 mg total) by mouth 2 (two) times daily. 60 capsule 1    Phentermine HCl 37.5 MG Oral Cap Take 37.5 mg by mouth every morning.      clobetasol 0.05 % External Ointment Apply 1 Application topically 3 (three) times a week. Every Monday / Wedn / Friday night 60 g 1    ibuprofen 800 MG Oral Tab Take 1 tablet (800 mg total) by mouth every 6 (six) hours as needed for Pain. (Patient not taking: Reported on 12/10/2024)      fluticasone propionate 50 MCG/ACT Nasal  Suspension       buPROPion HCl ER, XL, 150 MG Oral Tablet 24 Hr       alendronate 35 MG Oral Tab Take 1 tablet (35 mg total) by mouth every 7 days. (Patient not taking: Reported on 12/10/2024)      omeprazole 20 MG Oral Capsule Delayed Release Take 1 capsule (20 mg total) by mouth every morning before breakfast.      docusate sodium 100 MG Oral Cap Take 1 capsule (100 mg total) by mouth 2 (two) times daily.      naproxen 500 MG Oral Tab  (Patient not taking: Reported on 12/10/2024)  0    TraZODone HCl 100 MG Oral Tab       Sertraline HCl (ZOLOFT) 100 MG Oral Tab       Atorvastatin Calcium (LIPITOR) 10 MG Oral Tab       Cetirizine HCl (ZYRTEC ALLERGY) 10 MG Oral Cap Take  by mouth.        REVIEW OF SYSTEMS:   Bowel/Bladder Incontinence: as above  Coughing/sneezing/straining does not exacerbate the pain.  Numbness/tingling: as above  Weakness: as above  Weight Loss: Negative   Fever: Negative   Cardiovascular:  No current chest pain or palpitations   Respiratory:  No current shortness of breath   Gastrointestinal:  No active ulcer  Genitourinary:  Negative  Integumentary :  Negative  Psychiatric:  Negative  Hematologic: No active bleeding  Lymphatic: No current lymphedema  Allergic/Immunologic:  Negative  Musculoskeletal: As above  Neurological: As above  Denies chest pain, shortness of breath.  MEDICAL HISTORY:  Patient Active Problem List   Diagnosis    Dyspepsia    Helicobacter positive gastritis    Gastric erythema    Lichen sclerosus    Lumbar radiculopathy     Past Medical History:    Bipolar affective (HCC)    Cholelithiasis    laparoscopic cholecystectomy 1997    Depression    Helicobacter positive gastritis    treated and eradicated    High cholesterol    Hyperlipidemia    Post-menopausal bleeding     bleed, neg endometrial biopsy 2013    Screen for colon cancer    +polyp in 2022, repeat CLN in 2027. +family hx of CRC    Visual impairment     SURGICAL HISTORY:  Past Surgical History:   Procedure Laterality  Date    Biopsy of uterus lining  2013    neg endometrial biopsy          x 2    Cholecystectomy      Egd      Laparoscopic cholecystectomy      Tubal ligation  2007    laparoscopic BTL     FAMILY HISTORY:  Family History   Problem Relation Age of Onset    Heart Disease Maternal Uncle         CAD    Cancer Other         spinal cord    Diabetes Mother     Hypertension Mother     Lipids Mother         hyperlipidemia    Hypertension Father     Colon Cancer Father     Lipids Brother         hyperlipidemia    Colon Cancer Paternal Grandfather 75    Cancer Maternal Aunt 60        pancreatic    Ovarian Cancer Paternal Aunt         40s    Breast Cancer Neg      SOCIAL HISTORY:  Social History     Socioeconomic History    Marital status:      Spouse name: Not on file    Number of children: Not on file    Years of education: Not on file    Highest education level: Not on file   Occupational History    Not on file   Tobacco Use    Smoking status: Never     Passive exposure: Never    Smokeless tobacco: Never   Substance and Sexual Activity    Alcohol use: No     Alcohol/week: 0.0 standard drinks of alcohol    Drug use: No    Sexual activity: Yes     Birth control/protection: Tubal Ligation   Other Topics Concern     Service Not Asked    Blood Transfusions Not Asked    Caffeine Concern Yes     Comment: 2 cups soda daily    Occupational Exposure Not Asked    Hobby Hazards Not Asked    Sleep Concern Not Asked    Stress Concern Not Asked    Weight Concern Not Asked    Special Diet Not Asked    Back Care Not Asked    Exercise Yes    Bike Helmet Not Asked    Seat Belt Not Asked    Self-Exams Not Asked   Social History Narrative    Not on file     Social Drivers of Health     Financial Resource Strain: Not on file   Food Insecurity: Not on file   Transportation Needs: Not on file   Physical Activity: Not on file   Stress: Not on file   Social Connections: Not on file   Housing Stability: Not on file      ADVANCE CARE PLANNING:    Advance Care Plan NOT discussed.  PHYSICAL EXAMINATION:  Vitals:    12/10/24 1005   BP: 121/85   Pulse: 64      Review of Systems:  CONSTITUTIONAL: denies fevers, chills  HEENT: denies swallowing difficulties, sore throat  CARDIOVASCULAR: denies chest pain, palpitations, syncope  RESPIRATORY: denies shortness of breath, cough, wheezing  GI: denies change in bowel habits, nausea, vomiting  : denies change in bladder function, frequency, dysuria  SKIN: denies rash, skin changes  MSK: Per HPI  NEURO: Per HPI  PSYCH: Per HPI      General Physical Exam:    Constitutional  Oriented to person, place, and time. Appears well-developed and   well-nourished  Head    Normocephalic and atraumatic.  Eyes    Pupils are equal, round, and reactive to light.  Neck    Neck supple  Cardiovascular  Minimal to no peripheral edema, intact distal pulses  Pulmonary/Chest  Effort normal, no shortness of breath noted  Neurological   Alert and oriented to person, place, and time  Skin    Skin is warm and dry  Psychiatric   Normal mood and affect, behavior and judgment    Neurologic & Musculoskeletal Exam    Lumbar    Region Exam Right (+/-) Left  (+/-)   Lumbar Musculature Tender w/ palpation - -   Lumbar Facet Pain w/ extension ++ ++         Lower Extremity SLR - -   Lower Extremity Crossed SLR - -       Sensation Right Left   L2: Proximal Anterior Thigh Normal Normal   L3: Mid Anterior Thigh Normal Normal   L4: Medial leg/foot, great toe (Saphenous n.) Normal Normal   L5: Dorsum of mid foot Normal Normal   S1: Lateral leg/foot, little toe, back of leg (Sural n.) Normal Normal       Motor Strength Right Left   L2:  5/5 5/5   L3:  5/5 5/5   L4:  5/5 5/5   L5:  5/5 5/5   S1:  5/5 5/5       Reflexes Right Left   L4: Patellar 2/4 2/4   S1: Achilles 2/4 2/4   Babinski Absent Absent   Clonus Absent Absent       Sacroiliac Joint    Exam Right Left   Alejandro's Finger (PSIS) - -   MONICA - -   Gaenslen's - -   Compression  - -   Distraction - -       Hip    Exam Right Left   FADIR - -   Greater Trochanter tenderness - -   Piriformis tenderness - -       Knee    Exam Right Left   Palpation tender - -   Effusion - -   ACL/PCL - -   Valgus/Varus - -   Corinne's - -     IMAGING:  L4: bilateral pars defects  L4-L5: uncovering the disc due to anterolisthesis. Disc osteophyte complex slightly asymetric to the R. Suture arthorpathy of the pars defects. No canal stenosis. Moderate bilateral foraminal stenosis.   L5-S1: disc oisteophyte complex which is relatively syymmetric. Superimposed L pararentral disc protrusion 15mm at its base and 4mm AP. Facet arthropathy. Mild canal stenosis with mildy accentuated left subarticular stenosis. Moderate to severe bilateral foraminal stenosis.   [place] PHYSICIAN MONITORING PROGRAM REVIEWED  Yes    ASSESSMENT AND PLAN:    Melodie Barlow is a 59 year old  female, with a couple of months of RLE radiculopathy due to lumbar degenerative disc disease as well as bilateral pars defects as well as anterolisthesis and stenosis who returns for a post-procedure follow up visit. Patient is s/p right TFESI L5-S1 on 3/8/2023 and reports 90% improvement in her RLE pain symptoms. She continues to have some persistent neuropathy in the RLE but did not tolerate gabapentin or Lyrica.     #Lumbar Radicular Syndrome, chronic stable   -Symptoms in the right L5 dermatome  -Previous good response with TFESI   -Continue conservative measures, if symptoms flare up we can consider ILESI to help with her axial low back pain   - Patient will call to schedule injection after coming back from [place]     Total Time: 30 minutes   Comprehensive analgesic plan was formulated. Conservative vs. Aggressive measures were discussed at length including pharmacotherapy (eg. Anti- inflammatories, muscle relaxants, neuropathic medications, oral steroids, analgesics), injections, and further testing. Risks and benefits of all options were discussed at  length to patients satisfaction during a comprehensive interactive discussion. All questions were answered during extended questions and answer session. Patient agreeable to discussion plan. Greater than 50% of the time was spent with counseling (nature of discussion centered around pain, therapy, and treatment options), face to face time, time spent reviewing data, obtaining patient information and discussing the care with the patients health care providers.     Fredo Bird MD, 12/10/24, 6:05 PM

## 2024-12-10 NOTE — PATIENT INSTRUCTIONS
Refill policies:    Allow 2-3 business days for refills; controlled substances may take longer.  Contact your pharmacy at least 5 days prior to running out of medication and have them send an electronic request or submit request through the “request refill” option in your [employer] account.  Refills are not addressed on weekends; covering physicians do not authorize routine medications on weekends.  No narcotics or controlled substances are refilled after noon on Fridays or by on call physicians.  By law, narcotics must be electronically prescribed.  A 30 day supply with no refills is the maximum allowed.  If your prescription is due for a refill, you may be due for a follow up appointment.  To best provide you care, patients receiving routine medications need to be seen at least once a year.  Patients receiving narcotic/controlled substance medications need to be seen at least once every 3 months.  In the event that your preferred pharmacy does not have the requested medication in stock (e.g. Backordered), it is your responsibility to find another pharmacy that has the requested medication available.  We will gladly send a new prescription to that pharmacy at your request.    Scheduling Tests:    If your physician has ordered radiology tests such as MRI or CT scans, please contact Central Scheduling at [phone] right away to schedule the test.  Once scheduled, the [facility] Centralized Referral Team will work with your insurance carrier to obtain pre-certification or prior authorization.  Depending on your insurance carrier, approval may take 3-10 days.  It is highly recommended patients assure they have received an authorization before having a test performed.  If test is done without insurance authorization, patient may be responsible for the entire amount billed.      Precertification and Prior Authorizations:  If your physician has recommended that you have a procedure or additional testing performed the [facility]  Centralized Referral Team will contact your insurance carrier to obtain pre-certification or prior authorization.    You are strongly encouraged to contact your insurance carrier to verify that your procedure/test has been approved and is a COVERED benefit.  Although the [facility] Centralized Referral Team does its due diligence, the insurance carrier gives the disclaimer that \"Although the procedure is authorized, this does not guarantee payment.\"    Ultimately the patient is responsible for payment.   Thank you for your understanding in this matter.  Paperwork Completion:  If you require FMLA or disability paperwork for your recovery, please make sure to either drop it off or have it faxed to our office at [phone]. Be sure the form has your name and date of birth on it.  The form will be faxed to our Forms Department and they will complete it for you.  There is a 25$ fee for all forms that need to be filled out.  Please be aware there is a 10-14 day turnaround time.  You will need to sign a release of information (DAYANA) form if your paperwork does not come with one.  You may call the Forms Department with any questions at [phone].  Their fax number is [phone].

## 2024-12-10 NOTE — PROGRESS NOTES
Last procedure: 11.18.24-Bilateral L4/5, L5/S1 [facility]    Percentage of relief obtained: none    Duration of relief: NA    Current Pain Score: 4/10    Narcotic Contract Exp: NA

## 2025-04-03 ENCOUNTER — TELEPHONE (OUTPATIENT)
Dept: PAIN CLINIC | Facility: CLINIC | Age: 60
End: 2025-04-03

## 2025-04-03 NOTE — TELEPHONE ENCOUNTER
Patient calling to request refill of pregabalin 50 MG Oral Cap    Pharmacy   [id number] - [place], [place] - [address] [phone], [phone]   [address] [address]       Patient informed of 48 hour refill policy excluding weekends and holidays.   Informed patient prescription is sent directly to pharmacy.    Further explained patient will not receive a call back once prescription is ready.

## 2025-04-08 NOTE — TELEPHONE ENCOUNTER
Per OV note dated 10/17/24 w/ Dr. Bird, pt reported she did not tolerate gabapentin or Lyrica:    \"Lyrica (caused her to not act \"like herself\"), gabapentin (anxiety)\"    Last script for lyrica 50 mg was provided on 12/10/24 for a 2 month supply by Dr. Bird.

## 2025-04-29 ENCOUNTER — HOSPITAL ENCOUNTER (OUTPATIENT)
Dept: GENERAL RADIOLOGY | Facility: HOSPITAL | Age: 60
Discharge: HOME | End: 2025-04-29
Attending: NURSE PRACTITIONER
Payer: COMMERCIAL

## 2025-04-29 DIAGNOSIS — R05.9 COUGH, UNSPECIFIED: ICD-10-CM

## 2025-04-29 PROCEDURE — 71046 X-RAY EXAM CHEST 2 VIEWS: CPT | Performed by: NURSE PRACTITIONER

## 2025-05-20 ENCOUNTER — OFFICE VISIT (OUTPATIENT)
Dept: PAIN CLINIC | Facility: HOSPITAL | Age: 60
End: 2025-05-20
Attending: FAMILY MEDICINE
Payer: COMMERCIAL

## 2025-05-20 DIAGNOSIS — M54.16 SPINAL STENOSIS OF LUMBAR REGION WITH RADICULOPATHY: Primary | ICD-10-CM

## 2025-05-20 DIAGNOSIS — M48.061 SPINAL STENOSIS OF LUMBAR REGION WITH RADICULOPATHY: Primary | ICD-10-CM

## 2025-05-20 PROCEDURE — 99214 OFFICE O/P EST MOD 30 MIN: CPT | Performed by: STUDENT IN AN ORGANIZED HEALTH CARE EDUCATION/TRAINING PROGRAM

## 2025-05-20 NOTE — PATIENT INSTRUCTIONS
Refill policies:    Allow 2-3 business days for refills; controlled substances may take longer.  Contact your pharmacy at least 5 days prior to running out of medication and have them send an electronic request or submit request through the “request refill” option in your [email or web address] account.  Refills are not addressed on weekends; covering physicians do not authorize routine medications on weekends.  No narcotics or controlled substances are refilled after noon on Fridays or by on call physicians.  By law, narcotics must be electronically prescribed.  A 30 day supply with no refills is the maximum allowed.  If your prescription is due for a refill, you may be due for a follow up appointment.  To best provide you care, patients receiving routine medications need to be seen at least once a year.  Patients receiving narcotic/controlled substance medications need to be seen at least once every 3 months.  In the event that your preferred pharmacy does not have the requested medication in stock (e.g. Backordered), it is your responsibility to find another pharmacy that has the requested medication available.  We will gladly send a new prescription to that pharmacy at your request.    Scheduling Tests:    If your physician has ordered radiology tests such as MRI or CT scans, please contact Central Scheduling at [phone] right away to schedule the test.  Once scheduled, the [facility] Centralized Referral Team will work with your insurance carrier to obtain pre-certification or prior authorization.  Depending on your insurance carrier, approval may take 3-10 days.  It is highly recommended patients assure they have received an authorization before having a test performed.  If test is done without insurance authorization, patient may be responsible for the entire amount billed.      Precertification and Prior Authorizations:  If your physician has recommended that you have a procedure or additional testing performed the [facility]  Centralized Referral Team will contact your insurance carrier to obtain pre-certification or prior authorization.    You are strongly encouraged to contact your insurance carrier to verify that your procedure/test has been approved and is a COVERED benefit.  Although the [facility] Centralized Referral Team does its due diligence, the insurance carrier gives the disclaimer that \"Although the procedure is authorized, this does not guarantee payment.\"    Ultimately the patient is responsible for payment.   Thank you for your understanding in this matter.  Paperwork Completion:  If you require FMLA or disability paperwork for your recovery, please make sure to either drop it off or have it faxed to our office at [phone]. Be sure the form has your name and date of birth on it.  The form will be faxed to our Forms Department and they will complete it for you.  There is a 25$ fee for all forms that need to be filled out.  Please be aware there is a 10-14 day turnaround time.  You will need to sign a release of information (DAYANA) form if your paperwork does not come with one.  You may call the Forms Department with any questions at [phone].  Their fax number is [phone].

## 2025-05-20 NOTE — PROGRESS NOTES
Patient presents in office today with reported pain in lower back with bilateral radiculopathy. L>R     Current pain level reported = 7/10     Last reported dose of Pregabalin last night.         Narcotic Contract renewal NA

## 2025-05-20 NOTE — CHRONIC PAIN
[facility] Pain Consultation     History Of Present Illness    Melodie Barlow is a 60 year old female with a past medical history of hyperlipidemia, lumbar radiculopathy presents to pain clinic for continued management of low back pain.  Patient last saw Dr. Bird in 2024 at which point patient was having right lower extremity radiculopathy due to lumbar degenerative disc disease as well as bilateral pars defects. Patient states she has had return of her pain in her low back with shooting pain down her R leg. She states it is in the same distrubution as before and desires repeat injection. For medications patient take pregabalin which she gets from her PCP. Patient is not on any blood thinners. The pain causes significant stress in the patient's life, specifically interferes with general activity, mood, walking ability, ability to perform tasks at home and/or work.  Patient participates in physical therapy and continues to perform physician directed exercises at home. Denies any bowel or bladder incontinence, saddle anesthesia, worsening pain, weakness or falls.     Past Medical History  She has a past medical history of Bipolar affective ([facility]), Cholelithiasis, Depression, Helicobacter positive gastritis (11/2017), High cholesterol, Hyperlipidemia (2004), Post-menopausal bleeding, Screen for colon cancer (08/2016), and Visual impairment.    She has no past medical history of Anesthesia complication, Diabetes ([facility]), Difficult intubation, High blood pressure, Malignant hyperthermia, PONV (postoperative nausea and vomiting), Prediabetes, Pseudocholinesterase deficiency, Seizure disorder ([facility]), Sleep apnea, Stroke ([facility]), or Type 1 diabetes mellitus ([facility]).      PAIN COURSE AND PREVIOUS INTERVENTIONS:  Medications:  Lyrica 25mg once a day   Interventions:    3/8/2023-RT TRANS L5/S1-KONCAREVIC  11/2/2023-RT TRANS L5/S1-FAM  07.18.24-R Trans L5-S1  11.18.24-Bilateral L4/5, L5/S1 MBB-Fam  Adjuvants:  N/A    BLOOD  THINNING MEDICATIONS:  None  Surgical History  She has a past surgical history that includes tubal ligation (); laparoscopic cholecystectomy (); ; biopsy of uterus lining (); cholecystectomy; and egd.     Social History  She reports that she has never smoked. She has never been exposed to tobacco smoke. She has never used smokeless tobacco. She reports that she does not drink alcohol and does not use drugs.    Family History  Family History[1]     Allergies  Patient has no known allergies.    Review of Symptoms:   Constitutional: Negative for chills, diaphoresis or fever  HENT: Negative for neck swelling  Eyes:.  Negative for eye pain  Respiratory:.  Negative for cough, shortness of breath or wheezing    Cardiovascular:.  Negative for chest pain or palpitations  Gastrointestinal:.  Negative for abdominal pain, nausea and vomiting  Genitourinary:.  Negative for urgency  Musculoskeletal:  Positive for back pain. Positive for joint pain. Denies falls within the past 3 months.  Skin: Negative for wounds or itching   Neurological: Negative for dizziness, seizures, loss of consciousness and weakness  Endo/Heme/Allergies: Does not bruise/bleed easily  Psychiatric/Behavioral: Negative for depression. The patient does not appear anxious.       PHYSICAL EXAM  Vitals signs reviewed  Constitutional:       General: Not in acute distress     Appearance: Normal appearance. Not ill-appearing.  HENT:     Head: Normocephalic and atraumatic  Eyes:     Conjunctiva/sclera: Conjunctivae normal  Cardiovascular:     Rate and Rhythm: Normal rate and regular rhythm  Pulmonary:     Effort: No respiratory distress  Abdominal:     Palpations: Abdomen is soft  Musculoskeletal: HOFFMAN  Skin:     General: Skin is warm and dry  Neurological:     General: No focal deficit present  Psychiatric:         Mood and Affect: Mood normal         Behavior: Behavior normal    Advanced Exam   Inspection: No gross deformities,  Palpation:  Tenderness to palpation lumbar facets  ROM: Normal range of motion of the lumbar flexion extension  Motor: 5-5 strength upper and lower extremities  Sensory: Negative for sensory abnormalities in upper and lower extremities  Reflexes: 2+ reflexes bilateral upper and lower extremities  Lumbar:  + Straight Leg on R   Sacral: Negative Alecia, negative Gaenslen's, negative thigh thrust  Hip: Negative for pain with anterior, lateral, posterior palpation of hip joints, negative FADIR, negative for internal/external rotation of the hip, negative logroll     Last Recorded Vitals  There were no vitals filed for this visit.        Relevant Results  Current Outpatient Medications   Medication Instructions    alendronate (FOSAMAX) 35 mg, Every 7 days    Atorvastatin Calcium (LIPITOR) 10 MG Oral Tab No dose, route, or frequency recorded.    buPROPion HCl ER, XL, 150 MG Oral Tablet 24 Hr No dose, route, or frequency recorded.    Cetirizine HCl (ZYRTEC ALLERGY) 10 MG Oral Cap Take  by mouth.    clobetasol 0.05 % External Ointment 1 Application, Topical, Three times weekly, Every Monday / Wedn / Friday night    docusate sodium (COLACE) 100 mg, 2 times daily    fluticasone propionate 50 MCG/ACT Nasal Suspension     ibuprofen (MOTRIN) 800 mg, Every 6 hours PRN    naproxen 500 MG Oral Tab No dose, route, or frequency recorded.    omeprazole (PRILOSEC) 20 mg, Every morning before breakfast    Phentermine HCl 37.5 mg, Every morning    Sertraline HCl (ZOLOFT) 100 MG Oral Tab No dose, route, or frequency recorded.    TraZODone HCl 100 MG Oral Tab No dose, route, or frequency recorded.      No valid procedures specified.     Per chart   L4: bilateral pars defects  L4-L5: uncovering the disc due to anterolisthesis. Disc osteophyte complex slightly asymetric to the R. Suture arthorpathy of the pars defects. No canal stenosis. Moderate bilateral foraminal stenosis.     L5-S1: disc oisteophyte complex which is relatively syymmetric.  Superimposed L pararentral disc protrusion 15mm at its base and 4mm AP. Facet arthropathy. Mild canal stenosis with mildy accentuated left subarticular stenosis. Moderate to severe bilateral foraminal stenosis.       1. Spinal stenosis of lumbar region with radiculopathy        [place] [facility]IAN MONITORING PROGRAM REVIEWED  Yes    ASSESSMENT/PLAN  Melodie Barlow is a 60 year old female with a past medical history of hyperlipidemia, lumbar radiculopathy presents to pain clinic for continued management of low back pain.  Patient last saw Dr. Bird in 2024 and has intermittently received R L5 TF injections with over 50% relief of her radicular pain for around 6 months. Patient now having return of RLE radicular pain and will repeat R L5 Transforaminal injection at this time.    Our plan is as follows:  - Right L5 transforaminal epidural steroid  - Continue to participate in physical therapy as well as physician directed home exercises  - Continue pain medications as prescribed    Comprehensive analgesic plan was formulated. Conservative vs. Aggressive measures were discussed at length including pharmacotherapy (eg. Anti- inflammatories, muscle relaxants, neuropathic medications, oral steroids, analgesics), injections, and further testing. Risks and benefits of all options were discussed at length to patients satisfaction during a comprehensive interactive discussion. All questions were answered during extended questions and answer session. Patient agreeable to discussion plan. Greater than 50% of the time was spent with counseling (nature of discussion centered around pain, therapy, and treatment options), face to face time, time spent reviewing data, obtaining patient information and discussing the care with the patients health care providers.     Pt will return to clinic  for injection  Total time: 30 minutes       Av Ramos MD         [1]   Family History  Problem Relation Age of Onset    Heart Disease Maternal Uncle          CAD    Cancer Other         spinal cord    Diabetes Mother     Hypertension Mother     Lipids Mother         hyperlipidemia    Hypertension Father     Colon Cancer Father     Lipids Brother         hyperlipidemia    Colon Cancer Paternal Grandfather 75    Cancer Maternal Aunt 60        pancreatic    Ovarian Cancer Paternal Aunt         40s    Breast Cancer Neg

## 2025-05-29 ENCOUNTER — TELEPHONE (OUTPATIENT)
Dept: PAIN CLINIC | Facility: HOSPITAL | Age: 60
End: 2025-05-29

## 2025-05-29 DIAGNOSIS — M48.061 SPINAL STENOSIS OF LUMBAR REGION WITH RADICULOPATHY: Primary | ICD-10-CM

## 2025-05-29 DIAGNOSIS — M54.16 SPINAL STENOSIS OF LUMBAR REGION WITH RADICULOPATHY: Primary | ICD-10-CM

## 2025-05-29 NOTE — TELEPHONE ENCOUNTER
Prior authorization request completed for: Right L5/S1 TLESI   Authorization #No Prior Authorization Required.  Pre-D: Not Required.   Exclusions/Restrictions: -based on medical necessity.  Covered Benefit: -based on medical necessity.  Authorization dates: n/a  CPT codes approved: 89467  Number of visits/dates of service approved: 1  Physician: Richard   Location: [facility]    Call Ref#: [id number]  Representative Name: ELIAS  Insurance Carrier: [employer] (phone #: [phone]).     Patient can be scheduled. Routed to Navigator.

## 2025-05-29 NOTE — TELEPHONE ENCOUNTER
Patient advised of insurance approval to proceed with injections and is agreeable to scheduling. Patient scheduled for Right L5/S1 TLESI on 6/10 at [facility] with Dr. Ramos, levels: l5/s1, laterality: right, pre-procedure instructions reviewed. Patient prefers local sedation. Reviewed sedation instructions. PPatient verbalized understanding of instructions, no further needs at this time.

## 2025-06-10 PROBLEM — M48.061 SPINAL STENOSIS OF LUMBAR REGION WITH RADICULOPATHY: Status: ACTIVE | Noted: 2025-06-10

## 2025-06-10 PROBLEM — M54.16 SPINAL STENOSIS OF LUMBAR REGION WITH RADICULOPATHY: Status: ACTIVE | Noted: 2025-06-10

## 2025-06-25 ENCOUNTER — TELEPHONE (OUTPATIENT)
Dept: PAIN CLINIC | Facility: HOSPITAL | Age: 60
End: 2025-06-25

## 2025-06-25 ENCOUNTER — OFFICE VISIT (OUTPATIENT)
Dept: PAIN CLINIC | Facility: HOSPITAL | Age: 60
End: 2025-06-25
Attending: NURSE PRACTITIONER
Payer: COMMERCIAL

## 2025-06-25 DIAGNOSIS — M48.062 SPINAL STENOSIS OF LUMBAR REGION WITH NEUROGENIC CLAUDICATION: ICD-10-CM

## 2025-06-25 DIAGNOSIS — M54.16 LUMBAR RADICULOPATHY: Primary | ICD-10-CM

## 2025-06-25 PROCEDURE — 99214 OFFICE O/P EST MOD 30 MIN: CPT | Performed by: NURSE PRACTITIONER

## 2025-06-25 RX ORDER — PREGABALIN 25 MG/1
25 CAPSULE ORAL 3 TIMES DAILY
Qty: 90 CAPSULE | Refills: 0 | Status: SHIPPED | OUTPATIENT
Start: 2025-06-25 | End: 2025-07-25

## 2025-06-25 NOTE — PROGRESS NOTES
Last procedure: RIGHT L5/S1 TRANSFORAMINAL SILVIA   Date: 06.10.25  Percentage of relief obtained: 0%  Duration of relief: 0    Current Pain Score: 7

## 2025-06-25 NOTE — CHRONIC PAIN
Interventional pain medicine follow-up    CC: Epidural follow-up    S: Patient is a 60-year-old female, who is here for status post right transforaminal L5.  Patient endorses that she has had no significant pain relief and now has radiating pain to both right and left LE. Most of her pain radiated to her ankle and now has caused her to not be able to ambulate  she has not been able to sleep and stand for long periods of time. She also states that the lyrica 50mg makes her very sleepy.     She continues to perform her HEP. Denies any changes in bowel or bladder, fever, chills,cp, or sob    Last OV:  Melodie Barlow is a 60 year old female with a past medical history of hyperlipidemia, lumbar radiculopathy presents to pain clinic for continued management of low back pain.  Patient last saw Dr. Bird in 2024 at which point patient was having right lower extremity radiculopathy due to lumbar degenerative disc disease as well as bilateral pars defects. Patient states she has had return of her pain in her low back with shooting pain down her R leg. She states it is in the same distrubution as before and desires repeat injection. For medications patient take pregabalin which she gets from her PCP. Patient is not on any blood thinners. The pain causes significant stress in the patient's life, specifically interferes with general activity, mood, walking ability, ability to perform tasks at home and/or work.  Patient participates in physical therapy and continues to perform physician directed exercises at home     The patient specifically denies weight loss, changes in bathroom habits, fever, or chills.    Current Medications[1]    Allergies[2]    Past Medical History[3]    Problem List[4]    Past Surgical History[5]    Social History     Socioeconomic History    Marital status:      Spouse name: Not on file    Number of children: Not on file    Years of education: Not on file    Highest education level: Not on file    Occupational History    Not on file   Tobacco Use    Smoking status: Never     Passive exposure: Never    Smokeless tobacco: Never   Substance and Sexual Activity    Alcohol use: No     Alcohol/week: 0.0 standard drinks of alcohol    Drug use: No    Sexual activity: Yes     Birth control/protection: Tubal Ligation   Other Topics Concern     Service Not Asked    Blood Transfusions Not Asked    Caffeine Concern Yes     Comment: 2 cups soda daily    Occupational Exposure Not Asked    Hobby Hazards Not Asked    Sleep Concern Not Asked    Stress Concern Not Asked    Weight Concern Not Asked    Special Diet Not Asked    Back Care Not Asked    Exercise Yes    Bike Helmet Not Asked    Seat Belt Not Asked    Self-Exams Not Asked   Social History Narrative    Not on file     Social Drivers of Health     Food Insecurity: Not on file   Transportation Needs: Not on file   Housing Stability: Not on file       ROS:  Constitutional: denies weight loss, night sweats, fatigue  Eyes: denies visual changes, headache, eye pain, double vision  Ears, nose, mouth, and throat: denies runny nose, frequent nose bleeds, stuffy ears, ear pain, gingival bleeding, sore throat, gingival bleeding  Cardiovascular: denies chest pain, shortness of breath, orthopnea, palpitations, loss of consciousness  Respiratory: denies cough, sputum, wheezing, shortness of breath  Gastrointestinal: denies abdominal pain, bloating, cramping, nausea/vomitting, constipation  Musculoskeletal: denies joint swelling, crepitus, arthritis. + pain  Integumentary: denies pruritis, rashes, lesions, excessive dryness and/or discoloration  Neurological: denies headache, weakness, numbness, pins and needles  Psychiatric: denies depression, changes in sleep patterns, anxiety, difficulty concentrating  Endocrine: denies tremor, sweaty, slow, tired, polydipsia, polyuria  Hematologic/Lymphatic: denies gum bleeding, prolonged/excessive bleeding,  petechiae  Allergic/Immunologic: denies difficulty breathing, swelling at the neck/groin       RADIOLOGY REPORTS:   Per chart   L4: bilateral pars defects  L4-L5: uncovering the disc due to anterolisthesis. Disc osteophyte complex slightly asymetric to the R. Suture arthorpathy of the pars defects. No canal stenosis. Moderate bilateral foraminal stenosis.      L5-S1: disc oisteophyte complex which is relatively syymmetric. Superimposed L pararentral disc protrusion 15mm at its base and 4mm AP. Facet arthropathy. Mild canal stenosis with mildy accentuated left subarticular stenosis. Moderate to severe bilateral foraminal stenosis.    PHYSICAL EXAM:  There were no vitals taken for this visit.    The patient is in no distress. The patient is alert and oriented times three.  Mood and affect are normal.  Examination of the patient's skin and nails is grossly normal.  HEENT: Head is normocephalic, nontraumatic, no pinpoint pupils  CV: regular rate, no pedal edema  Resp: no audible wheezing, normal respiratory effort  Abdomen: Soft, nondistended    Gait: nonantalgic, assistance with ambulation:   Lumbar: Normal lordosis    Positive point tenderness above the right L1/5 facet joints.  Positive point tenderness above the left 1/5 facet joints.  Negative right SI tenderness.  Negative left SI tenderness.  Negative bilateral greater trochanteric bursa tenderness.  Positive straight leg raise testing on right at 10 degrees as it recreates the radicular symptoms.  Positive straight leg raise testing on left at 10 degrees as it recreates the radicular symptoms  2+/4 patellar and achilles reflexes on the right  2+/4 patellar and achilles reflexes on the left  5/5 right lower extremity strength   5/5 left lower extremity strength   Sensation is grossly intact to light touch bilateral lower extremities    ASSESSMENT:  Lumbar radiculopathy  Lumbar stenosis  Lumbar facet arthropathy    Pt became drowsy with Lyrica mg po bid, will  switch to lyrica 25mg po tid to see if this helps. She does report that lyrica has helped decrease her radiating pain to her right LLE. However, current even after TFESI she has residual pain that now is radiating to her left LLE. At this in order to target residual pain recommend LESI L4-5, and will rec to repeat MRI if this foes not help relieve her radiating pain.     [place] Prescription Monitoring Program reviewed    PLAN:  1. Injection Recommended: LESI L4-5, local with dr guillen   2. Anticoagulation: none  3. Imaging: MRI LS  4. Physical Therapy: cont HEP  5. Medications: Lyrica 25mg po tid  6. Follow up: for above injection     A total of 36 minutes were spent face-to-face with the patient during this encounter and over half of that time was spent on counseling and coordination of care. We discussed in depth the importance of weight loss and exercise which includes physical therapy. I also educated the patient about lifestyle modifications and proper body lifting mechanics.    CLARA Wayne  Interventional Pain Management         [1]   Current Outpatient Medications   Medication Sig Dispense Refill    pregabalin 50 MG Oral Cap Take 50 mg by mouth 3 (three) times daily.      Phentermine HCl 37.5 MG Oral Cap Take 37.5 mg by mouth every morning. (Patient not taking: Reported on 5/20/2025)      clobetasol 0.05 % External Ointment Apply 1 Application topically 3 (three) times a week. Every Monday / Wedn / Friday night (Patient not taking: Reported on 5/20/2025) 60 g 1    ibuprofen 800 MG Oral Tab Take 800 mg by mouth every 6 (six) hours as needed for Pain.      fluticasone propionate 50 MCG/ACT Nasal Suspension       buPROPion HCl ER, XL, 150 MG Oral Tablet 24 Hr       alendronate 35 MG Oral Tab Take 1 tablet (35 mg total) by mouth every 7 days.      omeprazole 20 MG Oral Capsule Delayed Release Take 1 capsule (20 mg total) by mouth every morning before breakfast.      docusate sodium 100 MG Oral  Cap Take 1 capsule (100 mg total) by mouth 2 (two) times daily. (Patient not taking: Reported on 2025)      naproxen 500 MG Oral Tab  (Patient not taking: Reported on 2025)  0    TraZODone HCl 100 MG Oral Tab       Sertraline HCl (ZOLOFT) 100 MG Oral Tab       Atorvastatin Calcium (LIPITOR) 10 MG Oral Tab 40 mg.      Cetirizine HCl (ZYRTEC ALLERGY) 10 MG Oral Cap Take by mouth.     [2] No Known Allergies  [3]   Past Medical History:   Bipolar affective ([facility])    Cholelithiasis    laparoscopic cholecystectomy     Depression    Helicobacter positive gastritis    treated and eradicated    High cholesterol    Hyperlipidemia    Post-menopausal bleeding     bleed, neg endometrial biopsy     Screen for colon cancer    +polyp in , repeat CLN in . +family hx of CRC    Visual impairment   [4]   Patient Active Problem List  Diagnosis    Dyspepsia    Helicobacter positive gastritis    Gastric erythema    Lichen sclerosus    Lumbar radiculopathy    Spinal stenosis of lumbar region with radiculopathy   [5]   Past Surgical History:  Procedure Laterality Date    Biopsy of uterus lining      neg endometrial biopsy          x 2    Cholecystectomy      Egd      Laparoscopic cholecystectomy      Tubal ligation  2007    laparoscopic BTL

## 2025-06-25 NOTE — TELEPHONE ENCOUNTER
Received phone call from pharmacy requesting to confirm lyrica dose.     Stating there was another prescription for lyrica picked up on 6/4 for pregabalin 50mg from Dr. Salgado.     Confirming if this was a change in dose or new rx?    Thank you!  Ellyn

## 2025-07-02 ENCOUNTER — TELEPHONE (OUTPATIENT)
Dept: PAIN CLINIC | Facility: HOSPITAL | Age: 60
End: 2025-07-02

## 2025-07-02 DIAGNOSIS — M54.16 LUMBAR RADICULOPATHY: Primary | ICD-10-CM

## 2025-07-02 NOTE — TELEPHONE ENCOUNTER
Prior authorization request completed for: JUAN CARLOS   Authorization #No Prior auth required per insurance plan   Authorization dates: 7/2/25-open  CPT codes approved: 72828  Number of visits/dates of service approved: 1  Physician: Corie  Location: [facility]    Call Ref#: [id number]  Representative Name: Olga  Insurance Carrier: [employer]    Patient can be scheduled. Routed to Navigator.

## 2025-07-02 NOTE — TELEPHONE ENCOUNTER
Patient advised of insurance approval to proceed with injections and is agreeable to scheduling. Patient scheduled for LESI on 7/16 at [facility] with Dr. Fontenot, pre-procedure instructions reviewed. Patient prefers local sedation. Reviewed sedation instructions. Patient verbalized understanding of instructions, no further needs at this time. Patient has been scheduled for follow up visit on: 8/4.

## 2025-07-03 ENCOUNTER — TELEPHONE (OUTPATIENT)
Dept: PAIN CLINIC | Facility: HOSPITAL | Age: 60
End: 2025-07-03

## 2025-07-03 NOTE — TELEPHONE ENCOUNTER
Valentina Crane  Signed 9:36 AM     Copy     Patient called to state she will be out of town on scheduled 7/16 surgery date.  Requesting a different date for procedure.  Patient can do 7/17 or 7/18.  Please call patient.

## 2025-07-03 NOTE — TELEPHONE ENCOUNTER
Patient called to state she will be out of town on scheduled 7/16 surgery date.  Requesting a different date for procedure.  Patient can do 7/17 or 7/18.  Please call patient.

## 2025-07-07 NOTE — TELEPHONE ENCOUNTER
Spoke with patient, states she can do 7/18.    Please let me know if any additional instructions!

## 2025-08-04 ENCOUNTER — OFFICE VISIT (OUTPATIENT)
Dept: PAIN CLINIC | Facility: HOSPITAL | Age: 60
End: 2025-08-04
Attending: ANESTHESIOLOGY

## 2025-08-04 VITALS — HEART RATE: 73 BPM | DIASTOLIC BLOOD PRESSURE: 70 MMHG | SYSTOLIC BLOOD PRESSURE: 112 MMHG

## 2025-08-04 DIAGNOSIS — M47.816 LUMBAR SPONDYLOSIS: ICD-10-CM

## 2025-08-04 DIAGNOSIS — M54.16 LUMBAR RADICULOPATHY: Primary | ICD-10-CM

## 2025-08-04 DIAGNOSIS — Z76.0 MEDICATION REFILL: ICD-10-CM

## 2025-08-04 PROCEDURE — 99214 OFFICE O/P EST MOD 30 MIN: CPT | Performed by: ANESTHESIOLOGY

## 2025-08-04 RX ORDER — PREGABALIN 25 MG/1
25 CAPSULE ORAL 3 TIMES DAILY
Qty: 90 CAPSULE | Refills: 0 | Status: SHIPPED | OUTPATIENT
Start: 2025-08-04 | End: 2025-09-03

## 2025-08-04 RX ORDER — PREGABALIN 25 MG/1
25 CAPSULE ORAL 3 TIMES DAILY
Qty: 90 CAPSULE | Refills: 0 | Status: SHIPPED | OUTPATIENT
Start: 2025-09-03 | End: 2025-10-03

## (undated) DIAGNOSIS — K63.5 POLYP OF COLON, UNSPECIFIED PART OF COLON, UNSPECIFIED TYPE: ICD-10-CM

## (undated) DIAGNOSIS — Z12.11 COLON CANCER SCREENING: ICD-10-CM

## (undated) DEVICE — ENDOSCOPY PACK UPPER: Brand: MEDLINE INDUSTRIES, INC.

## (undated) DEVICE — Device: Brand: DEFENDO AIR/WATER/SUCTION AND BIOPSY VALVE

## (undated) DEVICE — FORCEP RADIAL JAW 4

## (undated) NOTE — LETTER
[address], [place]  Authorization for Invasive Procedures  1.  I hereby authorize Dr. Jay Alfonso , my physician and whomever may be designated as the doctor's assistant, to perform the following operation and/or procedure:  E advancing medicine, science, and/or education, provided my identity is not revealed. If the procedure has been videotaped, the physician/surgeon will obtain the original videotape.  The hospital will not be responsible for storage or maintenance of this tap the procedure, I have explained to the patient and/or her legal representative, the risks and benefits involved in the proposed treatment and any reasonable alternative to the proposed treatment.  I have also explained the risks and benefits involved in the

## (undated) NOTE — LETTER
[facility]  Administration of Anesthesia  1. Maximus Lew, or _________________________________ acting on her behalf, (Patient) (Dependent/Representative) request to receive anesthesia for my pending procedure/operation/treatment.   A ph bleeding, seizure, cardiac arrest and death. 7. AWARENESS: I understand that it is possible (but unlikely) to have explicit memory of events from the operating room while under general anesthesia.   8. ELECTROCONVULSIVE THERAPY PATIENTS: This consent serve (Responsible person in case of minor/ unconscious pt) /Relationship    My signature below affirms that prior to the time of the procedure, I have explained to the patient and/or his/her guardian, the risks and benefits of undergoing anesthesia, as well as

## (undated) NOTE — LETTER
9/21/2018              [address] [place] [address],[address]         Dear Nora Pratt,      It was a pleasure to see you at our [address] [place] [address] office. Normal mammogram.  Next in one year.

## (undated) NOTE — LETTER
8/14/2017              [address]         Dear Tristen Calderón,      It was a pleasure to see you at our [address] [facility] office. Normal mammogram.  Next in one year.

## (undated) NOTE — LETTER
AUTHORIZATION FOR SURGICAL OPERATION OR OTHER PROCEDURE    1. I hereby authorize Dr. Marian Kim, and [place] [employer] [facility] staff assigned to my case to perform the following operation and/or procedure at the [place] [employer] [facility]:    [address]    2.  My physician has explained the nature and purpose of the operation or other procedure, possible alternative methods of treatment, the risks involved, and the possibility of complication to me. I acknowledge that no guarantee has been made as to the result that may be obtained. 3.  I recognize that, during the course of this operation, or other procedure, unforseen conditions may necessitate additional or different procedure than those listed above. I, therefore, further authorize and request that the above named physician, his/her physician assistants or designees perform such procedures as are, in his/her professional opinion, necessary and desirable. 4.  Any tissue or organs removed in the operation or other procedure may be disposed of by and at the discretion of the [facility] and [facility] AT [facility]. 5.  I understand that in the event of a medical emergency, I will be transported by local paramedics to [facility] or other hospital emergency department. 6.  I certify that I have read and fully understand the above consent to operation and/or other procedure. 7.  I acknowledge that my physician has explained sedation/analgesia administration to me including the risks and benefits. I consent to the administration of sedation/analgesia as may be necessary or desirable in the judgement of my physician. Witness signature: ___________________________________________________ Date:  ______/______/_____                    Time:  ________ A. M.  P.M.        Patient Name:  ______________________________________________________  (please print)      Patient signature:  ___________________________________________________             Relationship to Patient:           []  Parent    Responsible person                          []  Spouse  In case of minor or                    [] Other  _____________   Incompetent name:  __________________________________________________                               (please print)      _____________      Responsible person  In case of minor or  Incompetent signature:  _______________________________________________    Statement of Physician  My signature below affirms that prior to the time of the procedure, I have explained to the patient and/or his/her guardian, the risks and benefits involved in the proposed treatment and any reasonable alternative to the proposed treatment. I have also explained the risks and benefits involved in the refusal of the proposed treatment and have answered the patient's questions.                         Date:  ______/______/_______  Provider                      Signature:  __________________________________________________________       Time:  ___________ A.M    P.M.

## (undated) NOTE — MR AVS SNAPSHOT
After Visit Summary   9/5/2019    Yue Samano    MRN: [id number]           Visit Information     Date & Time  9/5/2019  9:40 AM Provider  Maegan Barlow MD Department  [address] [facility], [address] [place] [address] Dept.  Phone  [phone]      Your Vicky THINPREP PAP SMEAR ONLY [[id number] CUSTOM]  9/5/2019 9/5/2020      Imaging Scheduling Instructions     Thursday September 05, 2019   Imaging:   [facility] BUTCH 2D+3D SCREENING BILAT (BUM=[id number])    Instructions:   To schedule a test at [facility] AT [facility] cannot be changed, so think of one that is secure and easy to remember. 6. Create a [employer] password. You can change your password at any time. 7. Choose a Security Question and enter your Answer and click Next.  This can be used at a later time if you fo Don’t forget strength training with weights and resistance Set goals and track your progress   You don’t need to join a gym. Home exercises work great.  Put more priority on exercise in your life           DM now offers Video Visits through [address] for a Conditions needing urgent attention, but are not life-threatening. Average cost  $120*       EMERGENCY ROOM         Life-threatening emergencies needing immediate intervention   at a hospital emergency room.       Average cost  $2,300*   *Cost magnolia